# Patient Record
Sex: FEMALE | Race: WHITE | Employment: FULL TIME | URBAN - NONMETROPOLITAN AREA
[De-identification: names, ages, dates, MRNs, and addresses within clinical notes are randomized per-mention and may not be internally consistent; named-entity substitution may affect disease eponyms.]

---
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
## 2018-01-26 DIAGNOSIS — Z00.00 ROUTINE GENERAL MEDICAL EXAMINATION AT A HEALTH CARE FACILITY: ICD-10-CM

## 2018-01-26 DIAGNOSIS — R53.83 FATIGUE, UNSPECIFIED TYPE: ICD-10-CM

## 2018-01-26 LAB
ALBUMIN SERPL-MCNC: 4.4 G/DL (ref 3.5–5.2)
ALP BLD-CCNC: 101 U/L (ref 35–104)
ALT SERPL-CCNC: 15 U/L (ref 5–33)
ANION GAP SERPL CALCULATED.3IONS-SCNC: 18 MMOL/L (ref 7–19)
AST SERPL-CCNC: 17 U/L (ref 5–32)
BASOPHILS ABSOLUTE: 0 K/UL (ref 0–0.2)
BASOPHILS RELATIVE PERCENT: 0.5 % (ref 0–1)
BILIRUB SERPL-MCNC: 0.8 MG/DL (ref 0.2–1.2)
BUN BLDV-MCNC: 8 MG/DL (ref 6–20)
CALCIUM SERPL-MCNC: 9 MG/DL (ref 8.6–10)
CHLORIDE BLD-SCNC: 100 MMOL/L (ref 98–111)
CHOLESTEROL, TOTAL: 167 MG/DL (ref 160–199)
CO2: 26 MMOL/L (ref 22–29)
CREAT SERPL-MCNC: 0.6 MG/DL (ref 0.5–0.9)
EOSINOPHILS ABSOLUTE: 0.1 K/UL (ref 0–0.6)
EOSINOPHILS RELATIVE PERCENT: 1.1 % (ref 0–5)
GFR NON-AFRICAN AMERICAN: >60
GLUCOSE BLD-MCNC: 93 MG/DL (ref 74–109)
HCT VFR BLD CALC: 42 % (ref 37–47)
HDLC SERPL-MCNC: 52 MG/DL (ref 65–121)
HEMOGLOBIN: 14 G/DL (ref 12–16)
LDL CHOLESTEROL CALCULATED: 82 MG/DL
LYMPHOCYTES ABSOLUTE: 1.8 K/UL (ref 1.1–4.5)
LYMPHOCYTES RELATIVE PERCENT: 26.4 % (ref 20–40)
MCH RBC QN AUTO: 29.4 PG (ref 27–31)
MCHC RBC AUTO-ENTMCNC: 33.3 G/DL (ref 33–37)
MCV RBC AUTO: 88.2 FL (ref 81–99)
MONOCYTES ABSOLUTE: 0.5 K/UL (ref 0–0.9)
MONOCYTES RELATIVE PERCENT: 7.1 % (ref 0–10)
NEUTROPHILS ABSOLUTE: 4.3 K/UL (ref 1.5–7.5)
NEUTROPHILS RELATIVE PERCENT: 64.6 % (ref 50–65)
PDW BLD-RTO: 12.2 % (ref 11.5–14.5)
PLATELET # BLD: 292 K/UL (ref 130–400)
PMV BLD AUTO: 11.7 FL (ref 9.4–12.3)
POTASSIUM SERPL-SCNC: 3.7 MMOL/L (ref 3.5–5)
RBC # BLD: 4.76 M/UL (ref 4.2–5.4)
SODIUM BLD-SCNC: 144 MMOL/L (ref 136–145)
T4 FREE: 1.3 NG/DL (ref 0.9–1.7)
TOTAL PROTEIN: 7.3 G/DL (ref 6.6–8.7)
TRIGL SERPL-MCNC: 167 MG/DL (ref 0–149)
TSH SERPL DL<=0.05 MIU/L-ACNC: 2.4 UIU/ML (ref 0.27–4.2)
WBC # BLD: 6.7 K/UL (ref 4.8–10.8)

## 2018-02-02 ENCOUNTER — OFFICE VISIT (OUTPATIENT)
Dept: FAMILY MEDICINE CLINIC | Age: 18
End: 2018-02-02
Payer: COMMERCIAL

## 2018-02-02 VITALS
HEART RATE: 93 BPM | TEMPERATURE: 97.9 F | HEIGHT: 66 IN | BODY MASS INDEX: 25.49 KG/M2 | OXYGEN SATURATION: 98 % | DIASTOLIC BLOOD PRESSURE: 72 MMHG | SYSTOLIC BLOOD PRESSURE: 120 MMHG | WEIGHT: 158.6 LBS

## 2018-02-02 DIAGNOSIS — Z00.00 ANNUAL PHYSICAL EXAM: ICD-10-CM

## 2018-02-02 PROCEDURE — 99395 PREV VISIT EST AGE 18-39: CPT | Performed by: INTERNAL MEDICINE

## 2018-02-02 ASSESSMENT — ENCOUNTER SYMPTOMS
VOICE CHANGE: 0
COLOR CHANGE: 0
SORE THROAT: 0
EYE PAIN: 0
EYE REDNESS: 0
VOMITING: 0
WHEEZING: 0
RHINORRHEA: 0
DIARRHEA: 0
EYE DISCHARGE: 0
CHEST TIGHTNESS: 0
COUGH: 0
BLOOD IN STOOL: 0
SINUS PRESSURE: 0
ABDOMINAL PAIN: 0
SHORTNESS OF BREATH: 0

## 2018-02-02 ASSESSMENT — PATIENT HEALTH QUESTIONNAIRE - PHQ9
2. FEELING DOWN, DEPRESSED OR HOPELESS: 0
SUM OF ALL RESPONSES TO PHQ9 QUESTIONS 1 & 2: 0
SUM OF ALL RESPONSES TO PHQ QUESTIONS 1-9: 0
1. LITTLE INTEREST OR PLEASURE IN DOING THINGS: 0

## 2018-02-02 NOTE — PROGRESS NOTES
Instructions  Your Care Instructions    Physical exams can help you stay healthy. Your doctor has checked your overall health and may have suggested ways to take good care of yourself. He or she also may have recommended tests. At home, you can help prevent illness with healthy eating, regular exercise, and other steps. Follow-up care is a key part of your treatment and safety. Be sure to make and go to all appointments, and call your doctor if you are having problems. It's also a good idea to know your test results and keep a list of the medicines you take. How can you care for yourself at home? · Reach and stay at a healthy weight. This will lower your risk for many problems, such as obesity, diabetes, heart disease, and high blood pressure. · Get at least 30 minutes of physical activity on most days of the week. Walking is a good choice. You also may want to do other activities, such as running, swimming, cycling, or playing tennis or team sports. Discuss any changes in your exercise program with your doctor. · Do not smoke or allow others to smoke around you. If you need help quitting, talk to your doctor about stop-smoking programs and medicines. These can increase your chances of quitting for good. · Talk to your doctor about whether you have any risk factors for sexually transmitted infections (STIs). Having one sex partner (who does not have STIs and does not have sex with anyone else) is a good way to avoid these infections. · Use birth control if you do not want to have children at this time. Talk with your doctor about the choices available and what might be best for you. · Protect your skin from too much sun. When you're outdoors from 10 a.m. to 4 p.m., stay in the shade or cover up with clothing and a hat with a wide brim. Wear sunglasses that block UV rays. Even when it's cloudy, put broad-spectrum sunscreen (SPF 30 or higher) on any exposed skin.   · See a dentist one or two times a year for checkups and to have your teeth cleaned. · Wear a seat belt in the car. · Drink alcohol in moderation, if at all. That means no more than 2 drinks a day for men and 1 drink a day for women. Follow your doctor's advice about when to have certain tests. These tests can spot problems early. For everyone  · Cholesterol. Have the fat (cholesterol) in your blood tested after age 21. Your doctor will tell you how often to have this done based on your age, family history, or other things that can increase your risk for heart disease. · Blood pressure. Have your blood pressure checked during a routine doctor visit. Your doctor will tell you how often to check your blood pressure based on your age, your blood pressure results, and other factors. · Vision. Talk with your doctor about how often to have a glaucoma test.  · Diabetes. Ask your doctor whether you should have tests for diabetes. · Colon cancer. Have a test for colon cancer at age 48. You may have one of several tests. If you are younger than 48, you may need a test earlier if you have any risk factors. Risk factors include whether you already had a precancerous polyp removed from your colon or whether your parent, brother, sister, or child has had colon cancer. For women  · Breast exam and mammogram. Talk to your doctor about when you should have a clinical breast exam and a mammogram. Medical experts differ on whether and how often women under 50 should have these tests. Your doctor can help you decide what is right for you. · Pap test and pelvic exam. Begin Pap tests at age 24. A Pap test is the best way to find cervical cancer. The test often is part of a pelvic exam. Ask how often to have this test.  · Tests for sexually transmitted infections (STIs). Ask whether you should have tests for STIs. You may be at risk if you have sex with more than one person, especially if your partners do not wear condoms.   For men  · Tests for sexually transmitted infections (STIs). Ask whether you should have tests for STIs. You may be at risk if you have sex with more than one person, especially if you do not wear a condom. · Testicular cancer exam. Ask your doctor whether you should check your testicles regularly. · Prostate exam. Talk to your doctor about whether you should have a blood test (called a PSA test) for prostate cancer. Experts differ on whether and when men should have this test. Some experts suggest it if you are older than 39 and are -American or have a father or brother who got prostate cancer when he was younger than 72. When should you call for help? Watch closely for changes in your health, and be sure to contact your doctor if you have any problems or symptoms that concern you. Where can you learn more? Go to https://VideoStep.[employer]. org and sign in to your [employer] account. Enter P072 in the [employer] box to learn more about \"Well Visit, Ages 25 to 48: Care Instructions. \"     If you do not have an account, please click on the \"Sign Up Now\" link. Current as of: May 12, 2017  Content Version: 11.5  © 8757-4020 Healthwise, qualifyor. Care instructions adapted under license by [facility] ([facility]). If you have questions about a medical condition or this instruction, always ask your healthcare professional. [place] [address] any warranty or liability for your use of this information. Patient voices understanding and agrees to plans along with risks and benefits of plan. Counseling:  Manuela Stevenson's case, medications and options were discussed in detail. Patient was instructed to call the office if she   questions regarding her treatment. Should her conditions worsen, she should return to office to be reassessed by Dr. Lee Whaley. she  Should to go the closest Emergency Department for any emergency. They verbalized understanding the above instructions.            Return in about 1

## 2018-02-02 NOTE — PATIENT INSTRUCTIONS
exposed skin. · See a dentist one or two times a year for checkups and to have your teeth cleaned. · Wear a seat belt in the car. · Drink alcohol in moderation, if at all. That means no more than 2 drinks a day for men and 1 drink a day for women. Follow your doctor's advice about when to have certain tests. These tests can spot problems early. For everyone  · Cholesterol. Have the fat (cholesterol) in your blood tested after age 21. Your doctor will tell you how often to have this done based on your age, family history, or other things that can increase your risk for heart disease. · Blood pressure. Have your blood pressure checked during a routine doctor visit. Your doctor will tell you how often to check your blood pressure based on your age, your blood pressure results, and other factors. · Vision. Talk with your doctor about how often to have a glaucoma test.  · Diabetes. Ask your doctor whether you should have tests for diabetes. · Colon cancer. Have a test for colon cancer at age 48. You may have one of several tests. If you are younger than 48, you may need a test earlier if you have any risk factors. Risk factors include whether you already had a precancerous polyp removed from your colon or whether your parent, brother, sister, or child has had colon cancer. For women  · Breast exam and mammogram. Talk to your doctor about when you should have a clinical breast exam and a mammogram. Medical experts differ on whether and how often women under 50 should have these tests. Your doctor can help you decide what is right for you. · Pap test and pelvic exam. Begin Pap tests at age 24. A Pap test is the best way to find cervical cancer. The test often is part of a pelvic exam. Ask how often to have this test.  · Tests for sexually transmitted infections (STIs). Ask whether you should have tests for STIs.  You may be at risk if you have sex with more than one person, especially if your partners do not wear condoms. For men  · Tests for sexually transmitted infections (STIs). Ask whether you should have tests for STIs. You may be at risk if you have sex with more than one person, especially if you do not wear a condom. · Testicular cancer exam. Ask your doctor whether you should check your testicles regularly. · Prostate exam. Talk to your doctor about whether you should have a blood test (called a PSA test) for prostate cancer. Experts differ on whether and when men should have this test. Some experts suggest it if you are older than 39 and are -American or have a father or brother who got prostate cancer when he was younger than 72. When should you call for help? Watch closely for changes in your health, and be sure to contact your doctor if you have any problems or symptoms that concern you. Where can you learn more? Go to https://Laszlo Systemspemaryeb.healthSellbox. org and sign in to your [employer] account. Enter P072 in the [employer] box to learn more about \"Well Visit, Ages 25 to 48: Care Instructions. \"     If you do not have an account, please click on the \"Sign Up Now\" link. Current as of: May 12, 2017  Content Version: 11.5  © 2401-6835 Healthwise, Incorporated. Care instructions adapted under license by [facility] ([facility]). If you have questions about a medical condition or this instruction, always ask your healthcare professional. [address]  any warranty or liability for your use of this information.

## 2018-04-11 PROBLEM — Z00.00 ANNUAL PHYSICAL EXAM: Status: RESOLVED | Noted: 2018-02-02 | Resolved: 2018-04-11

## 2018-12-05 ENCOUNTER — OFFICE VISIT (OUTPATIENT)
Dept: URGENT CARE | Age: 18
End: 2018-12-05
Payer: COMMERCIAL

## 2018-12-05 ENCOUNTER — HOSPITAL ENCOUNTER (OUTPATIENT)
Dept: GENERAL RADIOLOGY | Age: 18
Discharge: HOME | End: 2018-12-05
Payer: COMMERCIAL

## 2018-12-05 VITALS
RESPIRATION RATE: 16 BRPM | HEIGHT: 65 IN | OXYGEN SATURATION: 98 % | DIASTOLIC BLOOD PRESSURE: 71 MMHG | TEMPERATURE: 97.6 F | SYSTOLIC BLOOD PRESSURE: 125 MMHG | WEIGHT: 150 LBS | HEART RATE: 82 BPM | BODY MASS INDEX: 24.99 KG/M2

## 2018-12-05 DIAGNOSIS — M79.671 RIGHT FOOT PAIN: ICD-10-CM

## 2018-12-05 DIAGNOSIS — S92.354A CLOSED NONDISPLACED FRACTURE OF FIFTH METATARSAL BONE OF RIGHT FOOT, INITIAL ENCOUNTER: Primary | ICD-10-CM

## 2018-12-05 PROCEDURE — 99213 OFFICE O/P EST LOW 20 MIN: CPT | Performed by: NURSE PRACTITIONER

## 2018-12-05 PROCEDURE — 73630 X-RAY EXAM OF FOOT: CPT

## 2018-12-05 ASSESSMENT — ENCOUNTER SYMPTOMS: COUGH: 0

## 2018-12-05 NOTE — PATIENT INSTRUCTIONS
Patient Education        Metatarsal Fracture: Care Instructions  Your Care Instructions    A metatarsal fracture is a break or a thin, hairline crack in one of the metatarsal bones of the foot. This type of fracture usually happens from repeated stress on the bones of the foot. Or it can happen when a person jumps or changes direction quickly and twists his or her foot or ankle the wrong way. This fracture is common among dancers because their work involves a lot of jumping, and balancing and turning on one foot. Treatment depends on how bad the fracture is and where the fracture is on the bone. You may or may not have had surgery. Your doctor may have put your foot in a cast or splint to keep it stable. You may have been given crutches to use to keep weight off your foot. A metatarsal fracture may take from 6 weeks to several months to heal. It is important to give your foot time to heal completely, so that you do not hurt it again. Do not return to your usual activities until your doctor says you can. Your doctor may suggest that you get physical therapy to help regain strength and range of motion in your foot. You heal best when you take good care of yourself. Eat a variety of healthy foods, and don't smoke. Follow-up care is a key part of your treatment and safety. Be sure to make and go to all appointments, and call your doctor if you are having problems. It is also a good idea to know your test results and keep a list of the medicines you take. How can you care for yourself at home? · Be safe with medicines. Read and follow all instructions on the label. ? If your doctor gave you a prescription medicine for pain, take it as prescribed. ? If you are not taking a prescription pain medicine, ask your doctor if you can take an over-the-counter medicine. · Follow your doctor's instructions about how much weight you can put on your foot and when you can go back to your usual activities.  If you were given day by day.     · You do not get better as expected. Where can you learn more? Go to https://chpepiceweb.[employer]. org and sign in to your F[employer] account. Enter [phone] in the [facility] box to learn more about \"Metatarsal Fracture: Care Instructions. \"     If you do not have an account, please click on the \"Sign Up Now\" link. Current as of: November 29, 2017  Content Version: 11.8  © 6072-2736 Healthwise, Incorporated. Care instructions adapted under license by [facility] ([facility]). If you have questions about a medical condition or this instruction, always ask your healthcare professional. [place] [address] any warranty or liability for your use of this information. 1. Ortho referral  2. Walking shoe  3. Ibuprofen for pain  4. Rest  5. Warm soaks in epsom salt   6.  Return to clinic if symptoms worsen or do not improve

## 2018-12-05 NOTE — PROGRESS NOTES
foot: There is decreased range of motion and tenderness. There is no swelling, normal capillary refill, no deformity and no laceration. Feet:    Skin: Skin is warm and dry. She is not diaphoretic. Nursing note and vitals reviewed. /71   Pulse 82   Temp 97.6 °F (36.4 °C) (Temporal)   Resp 16   Ht 5' 5\" (1.651 m)   Wt 150 lb (68 kg)   LMP 11/21/2018   SpO2 98%   BMI 24.96 kg/m²     Assessment:       Diagnosis Orders   1. Closed nondisplaced fracture of fifth metatarsal bone of right foot, initial encounter  Orthopaedic Erik Moss MD   2. Right foot pain  XR FOOT RIGHT (MIN 3 VIEWS)       Plan:   Xray:   A nondisplaced intra-articular fracture involving the base  of the fifth metatarsal bone. 1. Ortho referral  2. Walking shoe  3. Ibuprofen for pain  4. Rest  5. Warm soaks in epsom salt   6. Return to clinic if symptoms worsen or do not improve   Orders Placed This Encounter   Procedures    XR FOOT RIGHT (MIN 3 VIEWS)     Standing Status:   Future     Number of Occurrences:   1     Standing Expiration Date:   12/5/2019     Order Specific Question:   Reason for exam:     Answer:   injury   Stefanie Andres MD     Referral Priority:   Routine     Referral Type:   Eval and Treat     Referral Reason:   Specialty Services Required     Referred to Provider:   Rock Lucien MD     Requested Specialty:   Orthopedic Surgery     Number of Visits Requested:   1       No Follow-up on file. No orders of the defined types were placed in this encounter. Patient given educational materials- see patient instructions. Discussed use, benefit, and side effects of prescribedmedications. All patient questions answered. Pt voiced understanding. Reviewedhealth maintenance. Instructed to continue current medications, diet and exercise. Patient agreed with treatment plan. Follow up as directed.      Patient Instructions       Patient Education directed. · Put ice or a cold pack on your foot for 10 to 20 minutes at a time. Try to do this every 1 to 2 hours for the next 3 days (when you are awake) or until the swelling goes down. Put a thin cloth between the ice and your skin. · Prop up your foot on a pillow when you ice it or anytime you sit or lie down for the next 3 days. Try to keep it above the level of your heart. This will help reduce swelling. Cast and splint care  · If your foot is in a cast or splint, follow the cast or splint care instructions your doctor gives you. If you have a removable fiberglass walking cast or a splint, do not take it off unless your doctor tells you to. · Keep your cast or splint dry. If you have a removable fiberglass walking cast or a splint, ask your doctor if it is okay to remove it to bathe. Your doctor may want you to keep it on as much as possible. · If you're told to keep your cast or splint on, tape a sheet of plastic to cover it when you bathe. Or ask your doctor about products that can help keep a cast or splint dry. Water under the cast or splint can cause your skin to itch and hurt. · Never cut your cast or stick anything down it to scratch an itch on your leg. When should you call for help? Call your doctor now or seek immediate medical care if:    · You have problems with your cast or splint. For example:  ? The skin under the cast or splint is burning or stinging. ? The cast or splint feels too tight. ? There is a lot of swelling near the cast or splint. (Some swelling is normal.)  ? You have a new fever. ?  There is drainage or a bad smell coming from the cast or splint.     · You have increased or severe pain.     · You have tingling, weakness, or numbness in your foot and toes.     · You cannot move your toes.     · Your foot turns cold or changes color.    Watch closely for changes in your health, and be sure to contact your doctor if:    · The pain does not get better day by day.     · You do

## 2019-04-24 ENCOUNTER — NURSE ONLY (OUTPATIENT)
Dept: FAMILY MEDICINE CLINIC | Age: 19
End: 2019-04-24
Payer: COMMERCIAL

## 2019-04-24 DIAGNOSIS — Z23 NEED FOR TDAP VACCINATION: ICD-10-CM

## 2019-04-24 DIAGNOSIS — Z23 NEED FOR MENACTRA VACCINATION: ICD-10-CM

## 2019-04-24 DIAGNOSIS — Z23 NEED FOR PNEUMOCOCCAL VACCINATION: Primary | ICD-10-CM

## 2019-04-24 PROCEDURE — 90471 IMMUNIZATION ADMIN: CPT | Performed by: INTERNAL MEDICINE

## 2019-04-24 PROCEDURE — 90715 TDAP VACCINE 7 YRS/> IM: CPT | Performed by: INTERNAL MEDICINE

## 2019-04-24 PROCEDURE — 90670 PCV13 VACCINE IM: CPT | Performed by: INTERNAL MEDICINE

## 2019-04-24 PROCEDURE — 90472 IMMUNIZATION ADMIN EACH ADD: CPT | Performed by: INTERNAL MEDICINE

## 2019-04-24 PROCEDURE — 90734 MENACWYD/MENACWYCRM VACC IM: CPT | Performed by: INTERNAL MEDICINE

## 2019-04-24 NOTE — PROGRESS NOTES
After obtaining consent, and per orders of Dorian Francisco MD an  injection of Tdap given in Left deltoid. Prevnar 13 and Menveo given in right deltoid No adverse reaction noted. Well tolerated by patient. . Given By Heidy Jean.

## 2019-06-28 ENCOUNTER — OFFICE VISIT (OUTPATIENT)
Dept: FAMILY MEDICINE CLINIC | Age: 19
End: 2019-06-28
Payer: COMMERCIAL

## 2019-06-28 VITALS
DIASTOLIC BLOOD PRESSURE: 70 MMHG | SYSTOLIC BLOOD PRESSURE: 126 MMHG | OXYGEN SATURATION: 99 % | WEIGHT: 137.5 LBS | BODY MASS INDEX: 22.88 KG/M2 | HEART RATE: 78 BPM | TEMPERATURE: 98.6 F

## 2019-06-28 DIAGNOSIS — Z30.44 ENCOUNTER FOR SURVEILLANCE OF VAGINAL RING HORMONAL CONTRACEPTIVE DEVICE: ICD-10-CM

## 2019-06-28 DIAGNOSIS — N92.6 IRREGULAR MENSES: Primary | ICD-10-CM

## 2019-06-28 LAB
CONTROL: NORMAL
PREGNANCY TEST URINE, POC: NEGATIVE

## 2019-06-28 PROCEDURE — 99212 OFFICE O/P EST SF 10 MIN: CPT | Performed by: CLINICAL NURSE SPECIALIST

## 2019-06-28 PROCEDURE — 81025 URINE PREGNANCY TEST: CPT | Performed by: CLINICAL NURSE SPECIALIST

## 2019-06-28 RX ORDER — ETONOGESTREL AND ETHINYL ESTRADIOL 11.7; 2.7 MG/1; MG/1
1 INSERT, EXTENDED RELEASE VAGINAL
Qty: 3 EACH | Refills: 3 | Status: SHIPPED | OUTPATIENT
Start: 2019-06-28 | End: 2019-12-02 | Stop reason: SDUPTHER

## 2019-06-28 NOTE — PROGRESS NOTES
SUBJECTIVE:  Devora Huffman is a 22 y.o. who presents today for Contraception (Patient would like to discuss options for contraception.)      YANY Varela presents today to discuss contraceptive options. She has never used rx contraception. She has irregular periods. Normal bleeding without cramping. Desires something besides oral.  Also wants to have a monthly bleeding. LMP last week. Past Medical History:   Diagnosis Date    Foot fracture, right      No past surgical history on file. Family History   Problem Relation Age of Onset    Diabetes Paternal Grandfather     Anxiety Disorder Father     Bipolar Disorder Father     Anxiety Disorder Sister     Other Sister         mood disorder     Social History     Tobacco Use    Smoking status: Never Smoker    Smokeless tobacco: Never Used   Substance Use Topics    Alcohol use: No     Current Outpatient Medications   Medication Sig Dispense Refill    etonogestrel-ethinyl estradiol (NUVARING) 0.12-0.015 MG/24HR vaginal ring Place 1 each vaginally every 21 days Insert one (1) ring vaginally and leave in place for three (3) weeks, then remove for one (1) week. 3 each 3     No current facility-administered medications for this visit. No Known Allergies    Review of Systems   Constitutional: Negative for appetite change, chills and fever. Genitourinary: Positive for menstrual problem. Psychiatric/Behavioral: Negative for dysphoric mood. OBJECTIVE:  /70   Pulse 78   Temp 98.6 °F (37 °C) (Temporal)   Wt 137 lb 8 oz (62.4 kg)   SpO2 99%   BMI 22.88 kg/m²    Physical Exam   Constitutional: She is oriented to person, place, and time. She appears well-developed and well-nourished. No distress. Cardiovascular: Normal rate, regular rhythm and normal heart sounds. No murmur heard. Pulmonary/Chest: Effort normal and breath sounds normal. No respiratory distress. Neurological: She is alert and oriented to person, place, and time.

## 2019-10-16 ENCOUNTER — TELEPHONE (OUTPATIENT)
Dept: ULTRASOUND IMAGING | Age: 19
End: 2019-10-16

## 2019-10-16 ENCOUNTER — OFFICE VISIT (OUTPATIENT)
Dept: FAMILY MEDICINE CLINIC | Age: 19
End: 2019-10-16
Payer: COMMERCIAL

## 2019-10-16 VITALS
HEART RATE: 105 BPM | HEIGHT: 67 IN | DIASTOLIC BLOOD PRESSURE: 72 MMHG | SYSTOLIC BLOOD PRESSURE: 116 MMHG | TEMPERATURE: 97.9 F | BODY MASS INDEX: 21.56 KG/M2 | WEIGHT: 137.4 LBS | OXYGEN SATURATION: 98 %

## 2019-10-16 DIAGNOSIS — R11.2 NON-INTRACTABLE VOMITING WITH NAUSEA, UNSPECIFIED VOMITING TYPE: ICD-10-CM

## 2019-10-16 DIAGNOSIS — R10.11 RIGHT UPPER QUADRANT PAIN: ICD-10-CM

## 2019-10-16 DIAGNOSIS — R10.11 RIGHT UPPER QUADRANT PAIN: Primary | ICD-10-CM

## 2019-10-16 LAB
ALBUMIN SERPL-MCNC: 4.7 G/DL (ref 3.5–5.2)
ALP BLD-CCNC: 81 U/L (ref 35–104)
ALT SERPL-CCNC: 13 U/L (ref 5–33)
AMYLASE: 37 U/L (ref 28–100)
ANION GAP SERPL CALCULATED.3IONS-SCNC: 10 MMOL/L (ref 7–19)
AST SERPL-CCNC: 12 U/L (ref 5–32)
BASOPHILS ABSOLUTE: 0.1 K/UL (ref 0–0.2)
BASOPHILS RELATIVE PERCENT: 0.7 % (ref 0–1)
BILIRUB SERPL-MCNC: 0.8 MG/DL (ref 0.2–1.2)
BUN BLDV-MCNC: 7 MG/DL (ref 6–20)
CALCIUM SERPL-MCNC: 9.4 MG/DL (ref 8.6–10)
CHLORIDE BLD-SCNC: 103 MMOL/L (ref 98–111)
CHOLESTEROL, TOTAL: 174 MG/DL (ref 160–199)
CO2: 30 MMOL/L (ref 22–29)
CREAT SERPL-MCNC: 0.6 MG/DL (ref 0.5–0.9)
EOSINOPHILS ABSOLUTE: 0.1 K/UL (ref 0–0.6)
EOSINOPHILS RELATIVE PERCENT: 1.6 % (ref 0–5)
GFR NON-AFRICAN AMERICAN: >60
GLUCOSE BLD-MCNC: 87 MG/DL (ref 74–109)
HCT VFR BLD CALC: 43.9 % (ref 37–47)
HDLC SERPL-MCNC: 67 MG/DL (ref 65–121)
HEMOGLOBIN: 14.2 G/DL (ref 12–16)
IMMATURE GRANULOCYTES #: 0 K/UL
LDL CHOLESTEROL CALCULATED: 96 MG/DL
LIPASE: 44 U/L (ref 13–60)
LYMPHOCYTES ABSOLUTE: 2.2 K/UL (ref 1.1–4.5)
LYMPHOCYTES RELATIVE PERCENT: 30.7 % (ref 20–40)
MCH RBC QN AUTO: 28.8 PG (ref 27–31)
MCHC RBC AUTO-ENTMCNC: 32.3 G/DL (ref 33–37)
MCV RBC AUTO: 89 FL (ref 81–99)
MONOCYTES ABSOLUTE: 0.6 K/UL (ref 0–0.9)
MONOCYTES RELATIVE PERCENT: 8.6 % (ref 0–10)
NEUTROPHILS ABSOLUTE: 4.2 K/UL (ref 1.5–7.5)
NEUTROPHILS RELATIVE PERCENT: 58.1 % (ref 50–65)
PDW BLD-RTO: 13.3 % (ref 11.5–14.5)
PLATELET # BLD: 257 K/UL (ref 130–400)
PMV BLD AUTO: 11.6 FL (ref 9.4–12.3)
POTASSIUM SERPL-SCNC: 3.8 MMOL/L (ref 3.5–5)
RBC # BLD: 4.93 M/UL (ref 4.2–5.4)
SODIUM BLD-SCNC: 143 MMOL/L (ref 136–145)
TOTAL PROTEIN: 7.6 G/DL (ref 6.6–8.7)
TRIGL SERPL-MCNC: 56 MG/DL (ref 0–149)
WBC # BLD: 7.3 K/UL (ref 4.8–10.8)

## 2019-10-16 PROCEDURE — 99213 OFFICE O/P EST LOW 20 MIN: CPT | Performed by: INTERNAL MEDICINE

## 2019-10-16 ASSESSMENT — ENCOUNTER SYMPTOMS
EYE DISCHARGE: 0
COLOR CHANGE: 0
EYE PAIN: 0
SORE THROAT: 0
ABDOMINAL DISTENTION: 0
EYE REDNESS: 0
SINUS PRESSURE: 0
CHEST TIGHTNESS: 0
BLOOD IN STOOL: 0
NAUSEA: 1
DIARRHEA: 0
RHINORRHEA: 0
CONSTIPATION: 0
VOMITING: 1
VOICE CHANGE: 0
SHORTNESS OF BREATH: 0
COUGH: 0
WHEEZING: 0
ABDOMINAL PAIN: 1

## 2019-10-16 ASSESSMENT — PATIENT HEALTH QUESTIONNAIRE - PHQ9
SUM OF ALL RESPONSES TO PHQ QUESTIONS 1-9: 0
SUM OF ALL RESPONSES TO PHQ QUESTIONS 1-9: 0
SUM OF ALL RESPONSES TO PHQ9 QUESTIONS 1 & 2: 0
2. FEELING DOWN, DEPRESSED OR HOPELESS: 0
1. LITTLE INTEREST OR PLEASURE IN DOING THINGS: 0

## 2019-10-24 ENCOUNTER — HOSPITAL ENCOUNTER (OUTPATIENT)
Dept: ULTRASOUND IMAGING | Age: 19
Discharge: HOME | End: 2019-10-24
Payer: COMMERCIAL

## 2019-10-24 DIAGNOSIS — R10.11 RIGHT UPPER QUADRANT PAIN: ICD-10-CM

## 2019-10-24 DIAGNOSIS — R10.11 RIGHT UPPER QUADRANT PAIN: Primary | ICD-10-CM

## 2019-10-24 DIAGNOSIS — K80.20 GALLSTONES: ICD-10-CM

## 2019-10-24 PROCEDURE — 76705 ECHO EXAM OF ABDOMEN: CPT

## 2019-10-31 ENCOUNTER — OFFICE VISIT (OUTPATIENT)
Dept: SURGERY | Age: 19
End: 2019-10-31
Payer: COMMERCIAL

## 2019-10-31 VITALS
SYSTOLIC BLOOD PRESSURE: 116 MMHG | HEIGHT: 67 IN | DIASTOLIC BLOOD PRESSURE: 70 MMHG | BODY MASS INDEX: 21.97 KG/M2 | WEIGHT: 140 LBS

## 2019-10-31 DIAGNOSIS — K80.10 CALCULUS OF GALLBLADDER WITH CHRONIC CHOLECYSTITIS WITHOUT OBSTRUCTION: ICD-10-CM

## 2019-10-31 PROCEDURE — 99203 OFFICE O/P NEW LOW 30 MIN: CPT | Performed by: SURGERY

## 2019-10-31 ASSESSMENT — ENCOUNTER SYMPTOMS
EYE PAIN: 0
CONSTIPATION: 0
DIARRHEA: 0
ABDOMINAL PAIN: 0
CHEST TIGHTNESS: 0
SHORTNESS OF BREATH: 0
ABDOMINAL DISTENTION: 0
COUGH: 0
NAUSEA: 0
BACK PAIN: 0
SORE THROAT: 0
VOMITING: 0
COLOR CHANGE: 0
EYE REDNESS: 0

## 2019-11-01 RX ORDER — HEPARIN SODIUM 5000 [USP'U]/ML
5000 INJECTION, SOLUTION INTRAVENOUS; SUBCUTANEOUS ONCE
Status: CANCELLED | OUTPATIENT
Start: 2019-11-01 | End: 2019-11-01

## 2019-11-01 RX ORDER — SODIUM CHLORIDE 0.9 % (FLUSH) 0.9 %
10 SYRINGE (ML) INJECTION PRN
Status: CANCELLED | OUTPATIENT
Start: 2019-11-01

## 2019-11-01 RX ORDER — SODIUM CHLORIDE 0.9 % (FLUSH) 0.9 %
10 SYRINGE (ML) INJECTION EVERY 12 HOURS SCHEDULED
Status: CANCELLED | OUTPATIENT
Start: 2019-11-01

## 2019-11-01 RX ORDER — SODIUM CHLORIDE, SODIUM LACTATE, POTASSIUM CHLORIDE, CALCIUM CHLORIDE 600; 310; 30; 20 MG/100ML; MG/100ML; MG/100ML; MG/100ML
INJECTION, SOLUTION INTRAVENOUS CONTINUOUS
Status: CANCELLED | OUTPATIENT
Start: 2019-11-01

## 2019-11-21 ENCOUNTER — ANESTHESIA EVENT (OUTPATIENT)
Dept: OPERATING ROOM | Age: 19
End: 2019-11-21
Payer: COMMERCIAL

## 2019-11-22 ENCOUNTER — ANESTHESIA (OUTPATIENT)
Dept: OPERATING ROOM | Age: 19
End: 2019-11-22
Payer: COMMERCIAL

## 2019-11-22 ENCOUNTER — HOSPITAL ENCOUNTER (OUTPATIENT)
Age: 19
Setting detail: OUTPATIENT SURGERY
Discharge: HOME | End: 2019-11-22
Attending: SURGERY | Admitting: SURGERY
Payer: COMMERCIAL

## 2019-11-22 VITALS
RESPIRATION RATE: 16 BRPM | DIASTOLIC BLOOD PRESSURE: 61 MMHG | WEIGHT: 137 LBS | HEART RATE: 119 BPM | HEIGHT: 68 IN | TEMPERATURE: 97.3 F | OXYGEN SATURATION: 100 % | SYSTOLIC BLOOD PRESSURE: 116 MMHG | BODY MASS INDEX: 20.76 KG/M2

## 2019-11-22 VITALS
RESPIRATION RATE: 17 BRPM | OXYGEN SATURATION: 98 % | DIASTOLIC BLOOD PRESSURE: 48 MMHG | TEMPERATURE: 97.2 F | SYSTOLIC BLOOD PRESSURE: 86 MMHG

## 2019-11-22 DIAGNOSIS — K80.10 CALCULUS OF GALLBLADDER WITH CHRONIC CHOLECYSTITIS WITHOUT OBSTRUCTION: Primary | ICD-10-CM

## 2019-11-22 LAB — HCG(URINE) PREGNANCY TEST: NEGATIVE

## 2019-11-22 PROCEDURE — 6360000002 HC RX W HCPCS: Performed by: SURGERY

## 2019-11-22 PROCEDURE — 7100000011 HC PHASE II RECOVERY - ADDTL 15 MIN: Performed by: SURGERY

## 2019-11-22 PROCEDURE — 88304 TISSUE EXAM BY PATHOLOGIST: CPT

## 2019-11-22 PROCEDURE — 2500000003 HC RX 250 WO HCPCS: Performed by: SURGERY

## 2019-11-22 PROCEDURE — 7100000001 HC PACU RECOVERY - ADDTL 15 MIN: Performed by: SURGERY

## 2019-11-22 PROCEDURE — 47562 LAPAROSCOPIC CHOLECYSTECTOMY: CPT | Performed by: SURGERY

## 2019-11-22 PROCEDURE — 3600000019 HC SURGERY ROBOT ADDTL 15MIN: Performed by: SURGERY

## 2019-11-22 PROCEDURE — 3700000000 HC ANESTHESIA ATTENDED CARE: Performed by: SURGERY

## 2019-11-22 PROCEDURE — 84703 CHORIONIC GONADOTROPIN ASSAY: CPT

## 2019-11-22 PROCEDURE — 3700000001 HC ADD 15 MINUTES (ANESTHESIA): Performed by: SURGERY

## 2019-11-22 PROCEDURE — 6360000002 HC RX W HCPCS: Performed by: NURSE ANESTHETIST, CERTIFIED REGISTERED

## 2019-11-22 PROCEDURE — 2500000003 HC RX 250 WO HCPCS: Performed by: NURSE ANESTHETIST, CERTIFIED REGISTERED

## 2019-11-22 PROCEDURE — 6370000000 HC RX 637 (ALT 250 FOR IP): Performed by: ANESTHESIOLOGY

## 2019-11-22 PROCEDURE — 7100000000 HC PACU RECOVERY - FIRST 15 MIN: Performed by: SURGERY

## 2019-11-22 PROCEDURE — 3600000009 HC SURGERY ROBOT BASE: Performed by: SURGERY

## 2019-11-22 PROCEDURE — C9290 INJ, BUPIVACAINE LIPOSOME: HCPCS | Performed by: SURGERY

## 2019-11-22 PROCEDURE — 7100000010 HC PHASE II RECOVERY - FIRST 15 MIN: Performed by: SURGERY

## 2019-11-22 PROCEDURE — 2500000003 HC RX 250 WO HCPCS: Performed by: ANESTHESIOLOGY

## 2019-11-22 PROCEDURE — 2580000003 HC RX 258: Performed by: SURGERY

## 2019-11-22 PROCEDURE — 2709999900 HC NON-CHARGEABLE SUPPLY: Performed by: SURGERY

## 2019-11-22 PROCEDURE — S2900 ROBOTIC SURGICAL SYSTEM: HCPCS | Performed by: SURGERY

## 2019-11-22 PROCEDURE — 2580000003 HC RX 258: Performed by: NURSE ANESTHETIST, CERTIFIED REGISTERED

## 2019-11-22 RX ORDER — LIDOCAINE HYDROCHLORIDE 10 MG/ML
INJECTION, SOLUTION EPIDURAL; INFILTRATION; INTRACAUDAL; PERINEURAL PRN
Status: DISCONTINUED | OUTPATIENT
Start: 2019-11-22 | End: 2019-11-22 | Stop reason: SDUPTHER

## 2019-11-22 RX ORDER — SODIUM CHLORIDE 0.9 % (FLUSH) 0.9 %
10 SYRINGE (ML) INJECTION PRN
Status: DISCONTINUED | OUTPATIENT
Start: 2019-11-22 | End: 2019-11-22 | Stop reason: HOSPADM

## 2019-11-22 RX ORDER — SUCCINYLCHOLINE CHLORIDE 20 MG/ML
INJECTION INTRAMUSCULAR; INTRAVENOUS PRN
Status: DISCONTINUED | OUTPATIENT
Start: 2019-11-22 | End: 2019-11-22 | Stop reason: SDUPTHER

## 2019-11-22 RX ORDER — SODIUM CHLORIDE, SODIUM LACTATE, POTASSIUM CHLORIDE, CALCIUM CHLORIDE 600; 310; 30; 20 MG/100ML; MG/100ML; MG/100ML; MG/100ML
INJECTION, SOLUTION INTRAVENOUS CONTINUOUS
Status: DISCONTINUED | OUTPATIENT
Start: 2019-11-22 | End: 2019-11-22 | Stop reason: HOSPADM

## 2019-11-22 RX ORDER — INDOCYANINE GREEN AND WATER 25 MG
KIT INJECTION PRN
Status: DISCONTINUED | OUTPATIENT
Start: 2019-11-22 | End: 2019-11-22 | Stop reason: SDUPTHER

## 2019-11-22 RX ORDER — FENTANYL CITRATE 50 UG/ML
INJECTION, SOLUTION INTRAMUSCULAR; INTRAVENOUS PRN
Status: DISCONTINUED | OUTPATIENT
Start: 2019-11-22 | End: 2019-11-22 | Stop reason: SDUPTHER

## 2019-11-22 RX ORDER — ENALAPRILAT 2.5 MG/2ML
1.25 INJECTION INTRAVENOUS
Status: DISCONTINUED | OUTPATIENT
Start: 2019-11-22 | End: 2019-11-22 | Stop reason: HOSPADM

## 2019-11-22 RX ORDER — DEXAMETHASONE SODIUM PHOSPHATE 10 MG/ML
INJECTION INTRAMUSCULAR; INTRAVENOUS PRN
Status: DISCONTINUED | OUTPATIENT
Start: 2019-11-22 | End: 2019-11-22 | Stop reason: SDUPTHER

## 2019-11-22 RX ORDER — BUPIVACAINE HYDROCHLORIDE 2.5 MG/ML
INJECTION, SOLUTION INFILTRATION; PERINEURAL PRN
Status: DISCONTINUED | OUTPATIENT
Start: 2019-11-22 | End: 2019-11-22 | Stop reason: ALTCHOICE

## 2019-11-22 RX ORDER — LABETALOL 20 MG/4 ML (5 MG/ML) INTRAVENOUS SYRINGE
5 EVERY 10 MIN PRN
Status: DISCONTINUED | OUTPATIENT
Start: 2019-11-22 | End: 2019-11-22 | Stop reason: HOSPADM

## 2019-11-22 RX ORDER — SODIUM CHLORIDE 0.9 % (FLUSH) 0.9 %
10 SYRINGE (ML) INJECTION EVERY 12 HOURS SCHEDULED
Status: DISCONTINUED | OUTPATIENT
Start: 2019-11-22 | End: 2019-11-22 | Stop reason: HOSPADM

## 2019-11-22 RX ORDER — SODIUM CHLORIDE 9 MG/ML
INJECTION INTRAVENOUS PRN
Status: DISCONTINUED | OUTPATIENT
Start: 2019-11-22 | End: 2019-11-22 | Stop reason: ALTCHOICE

## 2019-11-22 RX ORDER — MEPERIDINE HYDROCHLORIDE 50 MG/ML
12.5 INJECTION INTRAMUSCULAR; INTRAVENOUS; SUBCUTANEOUS EVERY 5 MIN PRN
Status: DISCONTINUED | OUTPATIENT
Start: 2019-11-22 | End: 2019-11-22 | Stop reason: HOSPADM

## 2019-11-22 RX ORDER — SCOLOPAMINE TRANSDERMAL SYSTEM 1 MG/1
1 PATCH, EXTENDED RELEASE TRANSDERMAL ONCE
Status: DISCONTINUED | OUTPATIENT
Start: 2019-11-22 | End: 2019-11-22 | Stop reason: HOSPADM

## 2019-11-22 RX ORDER — PROMETHAZINE HYDROCHLORIDE 25 MG/ML
6.25 INJECTION, SOLUTION INTRAMUSCULAR; INTRAVENOUS
Status: DISCONTINUED | OUTPATIENT
Start: 2019-11-22 | End: 2019-11-22 | Stop reason: HOSPADM

## 2019-11-22 RX ORDER — HEPARIN SODIUM 5000 [USP'U]/ML
5000 INJECTION, SOLUTION INTRAVENOUS; SUBCUTANEOUS ONCE
Status: COMPLETED | OUTPATIENT
Start: 2019-11-22 | End: 2019-11-22

## 2019-11-22 RX ORDER — MIDAZOLAM HYDROCHLORIDE 1 MG/ML
INJECTION INTRAMUSCULAR; INTRAVENOUS PRN
Status: DISCONTINUED | OUTPATIENT
Start: 2019-11-22 | End: 2019-11-22 | Stop reason: SDUPTHER

## 2019-11-22 RX ORDER — ROCURONIUM BROMIDE 10 MG/ML
INJECTION, SOLUTION INTRAVENOUS PRN
Status: DISCONTINUED | OUTPATIENT
Start: 2019-11-22 | End: 2019-11-22 | Stop reason: SDUPTHER

## 2019-11-22 RX ORDER — APREPITANT 40 MG/1
40 CAPSULE ORAL ONCE
Status: DISCONTINUED | OUTPATIENT
Start: 2019-11-22 | End: 2019-11-22 | Stop reason: HOSPADM

## 2019-11-22 RX ORDER — HYDRALAZINE HYDROCHLORIDE 20 MG/ML
5 INJECTION INTRAMUSCULAR; INTRAVENOUS EVERY 10 MIN PRN
Status: DISCONTINUED | OUTPATIENT
Start: 2019-11-22 | End: 2019-11-22 | Stop reason: HOSPADM

## 2019-11-22 RX ORDER — MIDAZOLAM HYDROCHLORIDE 1 MG/ML
2 INJECTION INTRAMUSCULAR; INTRAVENOUS
Status: DISCONTINUED | OUTPATIENT
Start: 2019-11-22 | End: 2019-11-22 | Stop reason: HOSPADM

## 2019-11-22 RX ORDER — KETOROLAC TROMETHAMINE 30 MG/ML
INJECTION, SOLUTION INTRAMUSCULAR; INTRAVENOUS PRN
Status: DISCONTINUED | OUTPATIENT
Start: 2019-11-22 | End: 2019-11-22 | Stop reason: SDUPTHER

## 2019-11-22 RX ORDER — DIPHENHYDRAMINE HYDROCHLORIDE 50 MG/ML
12.5 INJECTION INTRAMUSCULAR; INTRAVENOUS
Status: DISCONTINUED | OUTPATIENT
Start: 2019-11-22 | End: 2019-11-22 | Stop reason: HOSPADM

## 2019-11-22 RX ORDER — ONDANSETRON 2 MG/ML
INJECTION INTRAMUSCULAR; INTRAVENOUS PRN
Status: DISCONTINUED | OUTPATIENT
Start: 2019-11-22 | End: 2019-11-22 | Stop reason: SDUPTHER

## 2019-11-22 RX ORDER — MORPHINE SULFATE 4 MG/ML
4 INJECTION, SOLUTION INTRAMUSCULAR; INTRAVENOUS EVERY 5 MIN PRN
Status: DISCONTINUED | OUTPATIENT
Start: 2019-11-22 | End: 2019-11-22 | Stop reason: HOSPADM

## 2019-11-22 RX ORDER — MORPHINE SULFATE 4 MG/ML
2 INJECTION, SOLUTION INTRAMUSCULAR; INTRAVENOUS EVERY 5 MIN PRN
Status: DISCONTINUED | OUTPATIENT
Start: 2019-11-22 | End: 2019-11-22 | Stop reason: HOSPADM

## 2019-11-22 RX ORDER — PROPOFOL 10 MG/ML
INJECTION, EMULSION INTRAVENOUS PRN
Status: DISCONTINUED | OUTPATIENT
Start: 2019-11-22 | End: 2019-11-22 | Stop reason: SDUPTHER

## 2019-11-22 RX ORDER — LIDOCAINE HYDROCHLORIDE 10 MG/ML
1 INJECTION, SOLUTION EPIDURAL; INFILTRATION; INTRACAUDAL; PERINEURAL
Status: DISCONTINUED | OUTPATIENT
Start: 2019-11-22 | End: 2019-11-22 | Stop reason: HOSPADM

## 2019-11-22 RX ORDER — METOCLOPRAMIDE HYDROCHLORIDE 5 MG/ML
10 INJECTION INTRAMUSCULAR; INTRAVENOUS
Status: COMPLETED | OUTPATIENT
Start: 2019-11-22 | End: 2019-11-22

## 2019-11-22 RX ORDER — SODIUM CHLORIDE, SODIUM LACTATE, POTASSIUM CHLORIDE, CALCIUM CHLORIDE 600; 310; 30; 20 MG/100ML; MG/100ML; MG/100ML; MG/100ML
INJECTION, SOLUTION INTRAVENOUS CONTINUOUS PRN
Status: DISCONTINUED | OUTPATIENT
Start: 2019-11-22 | End: 2019-11-22 | Stop reason: SDUPTHER

## 2019-11-22 RX ORDER — MORPHINE SULFATE 4 MG/ML
4 INJECTION, SOLUTION INTRAMUSCULAR; INTRAVENOUS
Status: DISCONTINUED | OUTPATIENT
Start: 2019-11-22 | End: 2019-11-22 | Stop reason: HOSPADM

## 2019-11-22 RX ORDER — HYDROCODONE BITARTRATE AND ACETAMINOPHEN 5; 325 MG/1; MG/1
1 TABLET ORAL EVERY 4 HOURS PRN
Qty: 18 TABLET | Refills: 0 | Status: SHIPPED | OUTPATIENT
Start: 2019-11-22 | End: 2019-11-25

## 2019-11-22 RX ORDER — EPHEDRINE SULFATE 50 MG/ML
INJECTION, SOLUTION INTRAVENOUS PRN
Status: DISCONTINUED | OUTPATIENT
Start: 2019-11-22 | End: 2019-11-22 | Stop reason: SDUPTHER

## 2019-11-22 RX ORDER — FENTANYL CITRATE 50 UG/ML
50 INJECTION, SOLUTION INTRAMUSCULAR; INTRAVENOUS
Status: DISCONTINUED | OUTPATIENT
Start: 2019-11-22 | End: 2019-11-22 | Stop reason: HOSPADM

## 2019-11-22 RX ADMIN — SUCCINYLCHOLINE CHLORIDE 120 MG: 20 INJECTION, SOLUTION INTRAMUSCULAR; INTRAVENOUS at 08:02

## 2019-11-22 RX ADMIN — HEPARIN SODIUM 5000 UNITS: 5000 INJECTION INTRAVENOUS; SUBCUTANEOUS at 07:02

## 2019-11-22 RX ADMIN — METOCLOPRAMIDE 10 MG: 5 INJECTION, SOLUTION INTRAMUSCULAR; INTRAVENOUS at 09:58

## 2019-11-22 RX ADMIN — ONDANSETRON HYDROCHLORIDE 4 MG: 2 INJECTION, SOLUTION INTRAMUSCULAR; INTRAVENOUS at 08:02

## 2019-11-22 RX ADMIN — DEXAMETHASONE SODIUM PHOSPHATE 10 MG: 10 INJECTION INTRAMUSCULAR; INTRAVENOUS at 08:02

## 2019-11-22 RX ADMIN — PROPOFOL 150 MG: 10 INJECTION, EMULSION INTRAVENOUS at 08:02

## 2019-11-22 RX ADMIN — KETOROLAC TROMETHAMINE 30 MG: 30 INJECTION, SOLUTION INTRAMUSCULAR; INTRAVENOUS at 08:02

## 2019-11-22 RX ADMIN — EPHEDRINE SULFATE 10 MG: 50 INJECTION INTRAMUSCULAR; INTRAVENOUS; SUBCUTANEOUS at 08:30

## 2019-11-22 RX ADMIN — SODIUM CHLORIDE, POTASSIUM CHLORIDE, SODIUM LACTATE AND CALCIUM CHLORIDE: 600; 310; 30; 20 INJECTION, SOLUTION INTRAVENOUS at 07:01

## 2019-11-22 RX ADMIN — FENTANYL CITRATE 100 MCG: 50 INJECTION INTRAMUSCULAR; INTRAVENOUS at 08:02

## 2019-11-22 RX ADMIN — ROCURONIUM BROMIDE 40 MG: 10 SOLUTION INTRAVENOUS at 08:06

## 2019-11-22 RX ADMIN — FAMOTIDINE 20 MG: 10 INJECTION, SOLUTION INTRAVENOUS at 07:01

## 2019-11-22 RX ADMIN — MORPHINE SULFATE 4 MG: 4 INJECTION INTRAVENOUS at 10:01

## 2019-11-22 RX ADMIN — ROCURONIUM BROMIDE 10 MG: 10 SOLUTION INTRAVENOUS at 08:02

## 2019-11-22 RX ADMIN — INDOCYANINE GREEN AND WATER 7.5 MG: KIT at 08:05

## 2019-11-22 RX ADMIN — LIDOCAINE HYDROCHLORIDE 5 ML: 10 INJECTION, SOLUTION EPIDURAL; INFILTRATION; INTRACAUDAL; PERINEURAL at 08:02

## 2019-11-22 RX ADMIN — SODIUM CHLORIDE, SODIUM LACTATE, POTASSIUM CHLORIDE, AND CALCIUM CHLORIDE: 600; 310; 30; 20 INJECTION, SOLUTION INTRAVENOUS at 07:54

## 2019-11-22 RX ADMIN — MIDAZOLAM 2 MG: 1 INJECTION INTRAMUSCULAR; INTRAVENOUS at 07:54

## 2019-11-22 RX ADMIN — Medication 2 G: at 08:05

## 2019-11-22 RX ADMIN — SODIUM CHLORIDE, SODIUM LACTATE, POTASSIUM CHLORIDE, AND CALCIUM CHLORIDE: 600; 310; 30; 20 INJECTION, SOLUTION INTRAVENOUS at 09:08

## 2019-11-22 ASSESSMENT — PAIN SCALES - GENERAL
PAINLEVEL_OUTOF10: 6
PAINLEVEL_OUTOF10: 0
PAINLEVEL_OUTOF10: 0

## 2019-11-22 ASSESSMENT — PAIN DESCRIPTION - DESCRIPTORS: DESCRIPTORS: BURNING

## 2019-11-22 ASSESSMENT — PAIN SCALES - WONG BAKER: WONGBAKER_NUMERICALRESPONSE: 0

## 2019-11-22 ASSESSMENT — PAIN DESCRIPTION - PAIN TYPE: TYPE: SURGICAL PAIN

## 2019-11-22 ASSESSMENT — PAIN DESCRIPTION - ONSET: ONSET: AWAKENED FROM SLEEP

## 2019-11-22 ASSESSMENT — PAIN DESCRIPTION - FREQUENCY: FREQUENCY: CONTINUOUS

## 2019-11-22 ASSESSMENT — PAIN DESCRIPTION - ORIENTATION: ORIENTATION: MID

## 2019-11-22 ASSESSMENT — PAIN DESCRIPTION - LOCATION: LOCATION: ABDOMEN

## 2019-12-02 DIAGNOSIS — N92.6 IRREGULAR MENSES: ICD-10-CM

## 2019-12-02 DIAGNOSIS — Z30.44 ENCOUNTER FOR SURVEILLANCE OF VAGINAL RING HORMONAL CONTRACEPTIVE DEVICE: ICD-10-CM

## 2019-12-02 RX ORDER — ETONOGESTREL AND ETHINYL ESTRADIOL 11.7; 2.7 MG/1; MG/1
1 INSERT, EXTENDED RELEASE VAGINAL
Qty: 3 EACH | Refills: 3 | Status: SHIPPED | OUTPATIENT
Start: 2019-12-02 | End: 2020-02-12 | Stop reason: SDUPTHER

## 2019-12-03 ENCOUNTER — OFFICE VISIT (OUTPATIENT)
Dept: SURGERY | Age: 19
End: 2019-12-03

## 2019-12-03 VITALS
HEIGHT: 68 IN | WEIGHT: 132.6 LBS | BODY MASS INDEX: 20.1 KG/M2 | SYSTOLIC BLOOD PRESSURE: 108 MMHG | DIASTOLIC BLOOD PRESSURE: 68 MMHG

## 2019-12-03 DIAGNOSIS — K80.10 CALCULUS OF GALLBLADDER WITH CHRONIC CHOLECYSTITIS WITHOUT OBSTRUCTION: Primary | ICD-10-CM

## 2019-12-03 DIAGNOSIS — Z09 POSTOP CHECK: ICD-10-CM

## 2019-12-03 PROBLEM — R10.11 RIGHT UPPER QUADRANT PAIN: Status: RESOLVED | Noted: 2019-10-16 | Resolved: 2019-12-03

## 2019-12-03 PROCEDURE — 99024 POSTOP FOLLOW-UP VISIT: CPT | Performed by: SURGERY

## 2019-12-09 ENCOUNTER — OFFICE VISIT (OUTPATIENT)
Dept: FAMILY MEDICINE CLINIC | Age: 19
End: 2019-12-09
Payer: COMMERCIAL

## 2019-12-09 VITALS
TEMPERATURE: 98.1 F | DIASTOLIC BLOOD PRESSURE: 78 MMHG | OXYGEN SATURATION: 98 % | WEIGHT: 133 LBS | BODY MASS INDEX: 20.16 KG/M2 | HEART RATE: 116 BPM | SYSTOLIC BLOOD PRESSURE: 124 MMHG | HEIGHT: 68 IN

## 2019-12-09 DIAGNOSIS — Z12.4 CERVICAL CANCER SCREENING: ICD-10-CM

## 2019-12-09 DIAGNOSIS — Z00.00 ANNUAL PHYSICAL EXAM: Primary | ICD-10-CM

## 2019-12-09 PROCEDURE — 99395 PREV VISIT EST AGE 18-39: CPT | Performed by: INTERNAL MEDICINE

## 2019-12-09 ASSESSMENT — ENCOUNTER SYMPTOMS
EYE PAIN: 0
CHEST TIGHTNESS: 0
RHINORRHEA: 0
WHEEZING: 0
ABDOMINAL PAIN: 0
COUGH: 0
EYE REDNESS: 0
DIARRHEA: 0
SINUS PRESSURE: 0
VOMITING: 0
VOICE CHANGE: 0
SHORTNESS OF BREATH: 0
SORE THROAT: 0
EYE DISCHARGE: 0
BLOOD IN STOOL: 0
COLOR CHANGE: 0

## 2019-12-10 DIAGNOSIS — Z12.4 CERVICAL CANCER SCREENING: Primary | ICD-10-CM

## 2019-12-10 DIAGNOSIS — Z12.4 CERVICAL CANCER SCREENING: ICD-10-CM

## 2019-12-16 ENCOUNTER — TELEPHONE (OUTPATIENT)
Dept: FAMILY MEDICINE CLINIC | Age: 19
End: 2019-12-16

## 2020-02-12 RX ORDER — ETONOGESTREL AND ETHINYL ESTRADIOL 11.7; 2.7 MG/1; MG/1
1 INSERT, EXTENDED RELEASE VAGINAL
Qty: 3 EACH | Refills: 3 | Status: SHIPPED | OUTPATIENT
Start: 2020-02-12 | End: 2020-02-28 | Stop reason: SDUPTHER

## 2020-02-28 RX ORDER — ETONOGESTREL AND ETHINYL ESTRADIOL 11.7; 2.7 MG/1; MG/1
1 INSERT, EXTENDED RELEASE VAGINAL
Qty: 3 EACH | Refills: 3 | Status: SHIPPED | OUTPATIENT
Start: 2020-02-28 | End: 2020-05-16 | Stop reason: SDUPTHER

## 2020-02-28 NOTE — TELEPHONE ENCOUNTER
Andrés Mandujano called to request a refill on her medication. Last office visit : 12/9/2019   Next office visit : 12/10/2020     Requested Prescriptions     Pending Prescriptions Disp Refills    etonogestrel-ethinyl estradiol (NUVARING) 0.12-0.015 MG/24HR vaginal ring 3 each 3     Sig: Place 1 each vaginally every 21 days Insert one (1) ring vaginally and leave in place for three (3) weeks, then remove for one (1) week.             Ida Feliz LPN

## 2020-05-18 RX ORDER — ETONOGESTREL AND ETHINYL ESTRADIOL 11.7; 2.7 MG/1; MG/1
1 INSERT, EXTENDED RELEASE VAGINAL
Qty: 3 EACH | Refills: 3 | Status: SHIPPED | OUTPATIENT
Start: 2020-05-18 | End: 2020-11-06 | Stop reason: SDUPTHER

## 2020-06-03 ENCOUNTER — OFFICE VISIT (OUTPATIENT)
Dept: URGENT CARE | Age: 20
End: 2020-06-03
Payer: COMMERCIAL

## 2020-06-03 VITALS
SYSTOLIC BLOOD PRESSURE: 184 MMHG | DIASTOLIC BLOOD PRESSURE: 84 MMHG | RESPIRATION RATE: 20 BRPM | OXYGEN SATURATION: 98 % | HEIGHT: 67 IN | HEART RATE: 90 BPM | BODY MASS INDEX: 21.79 KG/M2 | TEMPERATURE: 98.2 F | WEIGHT: 138.8 LBS

## 2020-06-03 LAB
APPEARANCE FLUID: ABNORMAL
BILIRUBIN, POC: NEGATIVE
BLOOD URINE, POC: ABNORMAL
CLARITY, POC: CLEAR
COLOR, POC: ABNORMAL
CONTROL: NORMAL
GLUCOSE URINE, POC: NEGATIVE
KETONES, POC: NEGATIVE
LEUKOCYTE EST, POC: ABNORMAL
NITRITE, POC: POSITIVE
PH, POC: 6
PREGNANCY TEST URINE, POC: NEGATIVE
PROTEIN, POC: NEGATIVE
SPECIFIC GRAVITY, POC: <=1.005
UROBILINOGEN, POC: 0.2

## 2020-06-03 PROCEDURE — 99213 OFFICE O/P EST LOW 20 MIN: CPT | Performed by: NURSE PRACTITIONER

## 2020-06-03 PROCEDURE — 81025 URINE PREGNANCY TEST: CPT | Performed by: NURSE PRACTITIONER

## 2020-06-03 PROCEDURE — 81002 URINALYSIS NONAUTO W/O SCOPE: CPT | Performed by: NURSE PRACTITIONER

## 2020-06-03 RX ORDER — NITROFURANTOIN 25; 75 MG/1; MG/1
100 CAPSULE ORAL 2 TIMES DAILY
Qty: 10 CAPSULE | Refills: 0 | Status: SHIPPED | OUTPATIENT
Start: 2020-06-03 | End: 2020-06-05

## 2020-06-03 ASSESSMENT — ENCOUNTER SYMPTOMS
ABDOMINAL PAIN: 0
NAUSEA: 0
VOMITING: 0
ABDOMINAL DISTENTION: 0
EYES NEGATIVE: 1
ALLERGIC/IMMUNOLOGIC NEGATIVE: 1
SHORTNESS OF BREATH: 0
CONSTIPATION: 0

## 2020-06-03 NOTE — PROGRESS NOTES
[address] [id number]  Dept: [phone]  Dept Fax: [phone]  Loc: [phone]     Georgia Moore is a 23 y.o. female who presents today for her medical conditions/complaintsas noted below. Georgia Moore is c/o of Dysuria        HPI:     HPI     Dysuria   This is a new problem. The current episode started in the past 7 days. The problem occurs every urination. The problem has been unchanged. The quality of the pain is described as burning. The pain is at a severity of 5/10. The pain is moderate. There has been no fever. There is no history of pyelonephritis. Associated symptoms include frequency and urgency. Pertinent negatives include no chills, discharge, flank pain, hematuria, hesitancy, nausea, possible pregnancy, sweats or vomiting. She has tried increased fluids for the symptoms. The treatment provided no relief.  .  Results for orders placed or performed in visit on 06/03/20   POCT Urinalysis no Micro   Result Value Ref Range    Color, UA Ornage     Clarity, UA Clear     Glucose, UA POC Negative     Bilirubin, UA Negative     Ketones, UA Negative     Spec Grav, UA <=1.005     Blood, UA POC Moderate     pH, UA 6.0     Protein, UA POC Negative     Urobilinogen, UA 0.2     Leukocytes, UA Small     Nitrite, UA Positive     Appearance, Fluid     POCT urine pregnancy   Result Value Ref Range    Preg Test, Ur Negative     Control              Results for orders placed or performed in visit on 06/03/20   POCT Urinalysis no Micro   Result Value Ref Range    Color, UA Ornage     Clarity, UA Clear     Glucose, UA POC Negative     Bilirubin, UA Negative     Ketones, UA Negative     Spec Grav, UA <=1.005     Blood, UA POC Moderate     pH, UA 6.0     Protein, UA POC Negative     Urobilinogen, UA 0.2     Leukocytes, UA Small     Nitrite, UA Positive     Appearance, Fluid     POCT urine pregnancy   Result Value Ref Range    Preg abdominal pain, constipation, nausea and vomiting. Genitourinary: Positive for frequency, hematuria and urgency. Negative for dysuria, flank pain, pelvic pain, vaginal bleeding, vaginal discharge and vaginal pain. Musculoskeletal: Negative. Skin: Negative. Allergic/Immunologic: Negative. Neurological: Negative. Hematological: Negative. Psychiatric/Behavioral: Negative. Objective:     Physical Exam  Vitals signs reviewed. Constitutional:       Appearance: She is well-developed. HENT:      Head: Normocephalic. Eyes:      Conjunctiva/sclera: Conjunctivae normal.   Cardiovascular:      Rate and Rhythm: Normal rate and regular rhythm. Pulmonary:      Effort: Pulmonary effort is normal.      Breath sounds: Normal breath sounds. Abdominal:      General: Bowel sounds are normal.      Palpations: Abdomen is soft. Tenderness: There is no abdominal tenderness. There is no right CVA tenderness or left CVA tenderness. Skin:     General: Skin is warm and moist.      Capillary Refill: Capillary refill takes less than 2 seconds. Neurological:      Mental Status: She is alert and oriented to person, place, and time. Psychiatric:         Behavior: Behavior normal.         Thought Content: Thought content normal.         Judgment: Judgment normal.       BP (!) 184/84   Pulse 90   Temp 98.2 °F (36.8 °C) (Oral)   Resp 20   Ht 5' 7\" (1.702 m)   Wt 138 lb 12.8 oz (63 kg)   LMP 05/20/2020   SpO2 98%   BMI 21.74 kg/m²     Assessment:          Diagnosis Orders   1. Acute cystitis with hematuria  nitrofurantoin, macrocrystal-monohydrate, (MACROBID) 100 MG capsule   2. Dysuria  POCT Urinalysis no Micro    POCT urine pregnancy       Plan:      Orders Placed This Encounter   Procedures    POCT Urinalysis no Micro    POCT urine pregnancy        No follow-ups on file.     Orders Placed This Encounter   Procedures    POCT Urinalysis no Micro    POCT urine pregnancy     Orders Placed This

## 2020-06-03 NOTE — PATIENT INSTRUCTIONS
1. Take antibiotic as prescribed  2. Will call with results of culture for further treatment recommendations  3. Follow up if symptoms worsen or fail to improve  To prevent UTIs  · Drink plenty of water each day. This helps you urinate often, which clears bacteria from your system. (If you have kidney, heart, or liver disease and have to limit fluids, talk with your doctor before you increase your fluid intake.)  · Urinate when you need to. · Urinate right after you have sex. · Change sanitary pads often. · Avoid douches, bubble baths, feminine hygiene sprays, and other feminine hygiene products that have deodorants.   · After going to the bathroom, wipe from front to back

## 2020-06-05 LAB
ORGANISM: ABNORMAL
URINE CULTURE, ROUTINE: ABNORMAL
URINE CULTURE, ROUTINE: ABNORMAL

## 2020-06-05 RX ORDER — SULFAMETHOXAZOLE AND TRIMETHOPRIM 800; 160 MG/1; MG/1
1 TABLET ORAL 2 TIMES DAILY
Qty: 14 TABLET | Refills: 0 | Status: SHIPPED | OUTPATIENT
Start: 2020-06-05 | End: 2020-06-12

## 2020-09-10 ENCOUNTER — TELEPHONE (OUTPATIENT)
Dept: FAMILY MEDICINE CLINIC | Age: 20
End: 2020-09-10

## 2020-09-10 NOTE — TELEPHONE ENCOUNTER
Can you call and see if she can come in today at 2:30 or 4:15 instead of tomorrow?   Lots of cancellations today now

## 2020-09-11 ENCOUNTER — OFFICE VISIT (OUTPATIENT)
Dept: FAMILY MEDICINE CLINIC | Age: 20
End: 2020-09-11
Payer: COMMERCIAL

## 2020-09-11 VITALS
HEIGHT: 68 IN | TEMPERATURE: 98.3 F | DIASTOLIC BLOOD PRESSURE: 62 MMHG | HEART RATE: 99 BPM | WEIGHT: 142.13 LBS | SYSTOLIC BLOOD PRESSURE: 108 MMHG | OXYGEN SATURATION: 99 % | BODY MASS INDEX: 21.54 KG/M2

## 2020-09-11 DIAGNOSIS — R10.13 EPIGASTRIC PAIN: ICD-10-CM

## 2020-09-11 DIAGNOSIS — R10.11 RIGHT UPPER QUADRANT ABDOMINAL PAIN: ICD-10-CM

## 2020-09-11 LAB
ALBUMIN SERPL-MCNC: 4.7 G/DL (ref 3.5–5.2)
ALP BLD-CCNC: 91 U/L (ref 35–104)
ALT SERPL-CCNC: 18 U/L (ref 5–33)
ANION GAP SERPL CALCULATED.3IONS-SCNC: 14 MMOL/L (ref 7–19)
AST SERPL-CCNC: 17 U/L (ref 5–32)
BASOPHILS ABSOLUTE: 0 K/UL (ref 0–0.2)
BASOPHILS RELATIVE PERCENT: 0.5 % (ref 0–1)
BILIRUB SERPL-MCNC: 1.6 MG/DL (ref 0.2–1.2)
BUN BLDV-MCNC: 10 MG/DL (ref 6–20)
C-REACTIVE PROTEIN: 0.12 MG/DL (ref 0–0.5)
CALCIUM SERPL-MCNC: 9.4 MG/DL (ref 8.6–10)
CHLORIDE BLD-SCNC: 99 MMOL/L (ref 98–111)
CO2: 28 MMOL/L (ref 22–29)
CREAT SERPL-MCNC: 0.7 MG/DL (ref 0.5–0.9)
EOSINOPHILS ABSOLUTE: 0.1 K/UL (ref 0–0.6)
EOSINOPHILS RELATIVE PERCENT: 0.9 % (ref 0–5)
GAMMA GLUTAMYL TRANSFERASE: 12 U/L (ref 7–54)
GFR AFRICAN AMERICAN: >59
GFR NON-AFRICAN AMERICAN: >60
GLUCOSE BLD-MCNC: 84 MG/DL (ref 74–109)
HCT VFR BLD CALC: 46.2 % (ref 37–47)
HEMOGLOBIN: 15.1 G/DL (ref 12–16)
IMMATURE GRANULOCYTES #: 0 K/UL
LYMPHOCYTES ABSOLUTE: 1.8 K/UL (ref 1.1–4.5)
LYMPHOCYTES RELATIVE PERCENT: 31 % (ref 20–40)
MCH RBC QN AUTO: 29.2 PG (ref 27–31)
MCHC RBC AUTO-ENTMCNC: 32.7 G/DL (ref 33–37)
MCV RBC AUTO: 89.2 FL (ref 81–99)
MONOCYTES ABSOLUTE: 0.4 K/UL (ref 0–0.9)
MONOCYTES RELATIVE PERCENT: 7.2 % (ref 0–10)
NEUTROPHILS ABSOLUTE: 3.5 K/UL (ref 1.5–7.5)
NEUTROPHILS RELATIVE PERCENT: 60.1 % (ref 50–65)
PDW BLD-RTO: 12.9 % (ref 11.5–14.5)
PLATELET # BLD: 268 K/UL (ref 130–400)
PMV BLD AUTO: 11.8 FL (ref 9.4–12.3)
POTASSIUM SERPL-SCNC: 3.9 MMOL/L (ref 3.5–5)
RBC # BLD: 5.18 M/UL (ref 4.2–5.4)
SODIUM BLD-SCNC: 141 MMOL/L (ref 136–145)
TOTAL PROTEIN: 7.5 G/DL (ref 6.6–8.7)
WBC # BLD: 5.8 K/UL (ref 4.8–10.8)

## 2020-09-11 PROCEDURE — 99214 OFFICE O/P EST MOD 30 MIN: CPT | Performed by: INTERNAL MEDICINE

## 2020-09-11 RX ORDER — SUCRALFATE ORAL 1 G/10ML
1 SUSPENSION ORAL
Qty: 1200 ML | Refills: 1 | Status: SHIPPED | OUTPATIENT
Start: 2020-09-11

## 2020-09-11 RX ORDER — OMEPRAZOLE 20 MG/1
20 CAPSULE, DELAYED RELEASE ORAL
Qty: 30 CAPSULE | Refills: 1 | Status: SHIPPED | OUTPATIENT
Start: 2020-09-11 | End: 2020-10-05

## 2020-09-11 ASSESSMENT — ENCOUNTER SYMPTOMS
BLOOD IN STOOL: 0
ABDOMINAL PAIN: 1
COUGH: 0
EYE DISCHARGE: 0
RHINORRHEA: 0
VOICE CHANGE: 0
SINUS PRESSURE: 0
VOMITING: 0
NAUSEA: 0
EYE REDNESS: 0
COLOR CHANGE: 0
CONSTIPATION: 0
DIARRHEA: 0
EYE PAIN: 0
CHEST TIGHTNESS: 0
WHEEZING: 0
ABDOMINAL DISTENTION: 0
SHORTNESS OF BREATH: 0
ANAL BLEEDING: 0
SORE THROAT: 0

## 2020-09-11 ASSESSMENT — PATIENT HEALTH QUESTIONNAIRE - PHQ9
1. LITTLE INTEREST OR PLEASURE IN DOING THINGS: 0
SUM OF ALL RESPONSES TO PHQ9 QUESTIONS 1 & 2: 0
SUM OF ALL RESPONSES TO PHQ QUESTIONS 1-9: 0
2. FEELING DOWN, DEPRESSED OR HOPELESS: 0
SUM OF ALL RESPONSES TO PHQ QUESTIONS 1-9: 0

## 2020-09-11 NOTE — PROGRESS NOTES
Petty Liu is a 23 y.o. female who presents today for   Chief Complaint   Patient presents with    Abdominal Pain     off and on 2 months       HPI  22 y/o WF here with c/o RUQ and sometimes MADAN pain that radiates to her back after she eats for the past 2 mths. She has not had nausea or vomiting. She has not had any constipation or diarrhea and no blood in stools. No fevers. She has been taking pepcid or ranitidine for the past week after she eats but not every time which helps some with the pain. No abnormal weight loss or fevers. She has not been taking any oral NSAIDs. She had a laparscopic cholecystectomy on 11/22/19 and she is worried she may have a stone in her bile duct. Review of Systems   Constitutional: Negative for appetite change, chills, diaphoresis, fatigue, fever and unexpected weight change. HENT: Negative for ear pain, rhinorrhea, sinus pressure, sore throat and voice change. Eyes: Negative for pain, discharge and redness. Respiratory: Negative for cough, chest tightness, shortness of breath and wheezing. Cardiovascular: Negative for chest pain and palpitations. Gastrointestinal: Positive for abdominal pain. Negative for abdominal distention, anal bleeding, blood in stool, constipation, diarrhea, nausea and vomiting. See HPI   Endocrine: Negative for cold intolerance, heat intolerance and polydipsia. Genitourinary: Negative for dysuria and hematuria. Musculoskeletal: Negative for arthralgias, myalgias, neck pain and neck stiffness. Skin: Negative for color change and rash. Neurological: Negative for dizziness, tremors, syncope, speech difficulty, weakness, numbness and headaches. Hematological: Negative for adenopathy. Does not bruise/bleed easily. Psychiatric/Behavioral: Negative for confusion, dysphoric mood and sleep disturbance. The patient is not nervous/anxious. All other systems reviewed and are negative.       Past Medical History:   Diagnosis Date  Foot fracture, right        Current Outpatient Medications   Medication Sig Dispense Refill    omeprazole (PRILOSEC) 20 MG delayed release capsule Take 1 capsule by mouth every morning (before breakfast) 30 capsule 1    sucralfate (CARAFATE) 1 GM/10ML suspension Take 10 mLs by mouth 4 times daily (after meals and at bedtime) 1200 mL 1    etonogestrel-ethinyl estradiol (NUVARING) 0.12-0.015 MG/24HR vaginal ring Place 1 each vaginally every 21 days Insert one (1) ring vaginally and leave in place for three (3) weeks, then remove for one (1) week. 3 each 3     No current facility-administered medications for this visit. No Known Allergies    Past Surgical History:   Procedure Laterality Date    CHOLECYSTECTOMY, LAPAROSCOPIC N/A 11/22/2019    SINGLE-SITE ROBOTIC ASSISTED LAPAROSCOPIC CHOLECYSTECTOMY performed by Anabel Beck DO at [facility] OR       Social History     Tobacco Use    Smoking status: Never Smoker    Smokeless tobacco: Never Used   Substance Use Topics    Alcohol use: No    Drug use: No       Family History   Problem Relation Age of Onset    Diabetes Paternal Grandfather     Anxiety Disorder Father     Bipolar Disorder Father     Anxiety Disorder Sister     Other Sister         mood disorder       /62   Pulse 99   Temp 98.3 °F (36.8 °C)   Ht 5' 8\" (1.727 m)   Wt 142 lb 2 oz (64.5 kg)   SpO2 99%   BMI 21.61 kg/m²     Physical Exam  Vitals signs reviewed. Constitutional:       General: She is not in acute distress. Appearance: Normal appearance. She is well-developed and normal weight. She is not ill-appearing or toxic-appearing. HENT:      Head: Normocephalic and atraumatic. Right Ear: Tympanic membrane, ear canal and external ear normal.      Left Ear: Tympanic membrane, ear canal and external ear normal.      Nose: Nose normal.      Mouth/Throat:      Lips: Pink. Mouth: Mucous membranes are moist. No oral lesions. Tongue: No lesions.       Palate: No suspension   3. Serum total bilirubin elevated  R17 US LIVER       Plan:  Julio Woodard was seen today for abdominal pain. Diagnoses and all orders for this visit:    Right upper quadrant abdominal pain  -     CBC Auto Differential; Future  -     Comprehensive Metabolic Panel; Future  -     C-Reactive Protein; Future  -     Gamma GT; Future  -     US LIVER; Future    Epigastric pain  -     CBC Auto Differential; Future  -     Comprehensive Metabolic Panel; Future  -     C-Reactive Protein; Future  -     Gamma GT; Future  -     omeprazole (PRILOSEC) 20 MG delayed release capsule; Take 1 capsule by mouth every morning (before breakfast)  -     sucralfate (CARAFATE) 1 GM/10ML suspension; Take 10 mLs by mouth 4 times daily (after meals and at bedtime)    Serum total bilirubin elevated  -     US LIVER; Future    1. Right upper quadrant and epigastric pain-no signs of infection or reproducible pain to indicate pancreatitis or bowel obstruction today. Will start Prilosec 20 mg daily with Carafate prior to meals and at bedtime as needed for midepigastric pain. Recommended patient avoid taking ranitidine given it was recalled due to risk of cancer. Patient voiced understanding. 2.  Right upper quadrant abdominal pain and mildly elevated total bilirubin level-we will check liver ultrasound to make sure there is no signs of obstruction or stricture in the common bile duct and pancreatic duct status post cholecystectomy. 3. Return if symptoms worsen or fail to improve. Over 50% of the total visit time of 25 min was spent on counseling and/or coordination of care of:   1. Right upper quadrant abdominal pain    2. Epigastric pain    3.  Serum total bilirubin elevated         Orders Placed This Encounter   Procedures    US LIVER     Standing Status:   Future     Standing Expiration Date:   9/11/2021     Order Specific Question:   Reason for exam:     Answer:   RUQ pain x2 mths with hx cholecystectomy 2019 and elevated bilirubin level    CBC Auto Differential     Standing Status:   Future     Number of Occurrences:   1     Standing Expiration Date:   9/11/2021    Comprehensive Metabolic Panel     Standing Status:   Future     Number of Occurrences:   1     Standing Expiration Date:   9/11/2021    C-Reactive Protein     Standing Status:   Future     Number of Occurrences:   1     Standing Expiration Date:   9/11/2021    Gamma GT     Standing Status:   Future     Number of Occurrences:   1     Standing Expiration Date:   9/11/2021     Orders Placed This Encounter   Medications    omeprazole (PRILOSEC) 20 MG delayed release capsule     Sig: Take 1 capsule by mouth every morning (before breakfast)     Dispense:  30 capsule     Refill:  1    sucralfate (CARAFATE) 1 GM/10ML suspension     Sig: Take 10 mLs by mouth 4 times daily (after meals and at bedtime)     Dispense:  1200 mL     Refill:  1     There are no discontinued medications. There are no Patient Instructions on file for this visit. Patient voices understanding and agrees to plans along with risks and benefits of plan. Counseling:  Yoli Stevenson's case, medications and options were discussed in detail. patient was instructed to call the office if she   questions regarding her treatment. Should her conditions worsen, she should return to office to be reassessed by Dr. Alaina Kim. she  Should to go the closest Emergency Department for any emergency. They verbalized understanding the above instructions.

## 2020-10-05 RX ORDER — OMEPRAZOLE 20 MG/1
CAPSULE, DELAYED RELEASE ORAL
Qty: 30 CAPSULE | Refills: 1 | Status: SHIPPED | OUTPATIENT
Start: 2020-10-05 | End: 2020-12-27 | Stop reason: SDUPTHER

## 2020-10-05 NOTE — TELEPHONE ENCOUNTER
Cristobal Beckie called to request a refill on her medication.       Last office visit : 9/11/2020   Next office visit : 12/10/2020     Requested Prescriptions     Pending Prescriptions Disp Refills    omeprazole (PRILOSEC) 20 MG delayed release capsule [Pharmacy Med Name: OMEPRAZOLE DR 20 MG CAPSULE] 30 capsule 1     Sig: TAKE 1 CAPSULE BY MOUTH EVERY DAY IN THE MORNING BEFORE BREAKFAST            Rj Urias [place]

## 2020-10-09 ENCOUNTER — HOSPITAL ENCOUNTER (OUTPATIENT)
Dept: ULTRASOUND IMAGING | Age: 20
Discharge: HOME | End: 2020-10-09
Payer: COMMERCIAL

## 2020-10-09 PROCEDURE — 76705 ECHO EXAM OF ABDOMEN: CPT

## 2020-10-19 RX ORDER — SUCRALFATE 1 G/1
1 TABLET ORAL 4 TIMES DAILY
Qty: 120 TABLET | Refills: 3 | Status: SHIPPED | OUTPATIENT
Start: 2020-10-19 | End: 2021-02-22 | Stop reason: ALTCHOICE

## 2020-10-19 NOTE — TELEPHONE ENCOUNTER
Roland Wilson called to request a refill on her medication.       Last office visit : 9/11/2020   Next office visit : 12/10/2020     Requested Prescriptions     Pending Prescriptions Disp Refills    sucralfate (CARAFATE) 1 GM tablet 120 tablet 3     Sig: Take 1 tablet by mouth 4 times daily            Hermilo Jameson MA

## 2020-11-06 RX ORDER — ETONOGESTREL AND ETHINYL ESTRADIOL 11.7; 2.7 MG/1; MG/1
1 INSERT, EXTENDED RELEASE VAGINAL
Qty: 3 EACH | Refills: 3 | Status: SHIPPED | OUTPATIENT
Start: 2020-11-06 | End: 2020-12-28 | Stop reason: SDUPTHER

## 2020-12-27 RX ORDER — ETONOGESTREL AND ETHINYL ESTRADIOL 11.7; 2.7 MG/1; MG/1
1 INSERT, EXTENDED RELEASE VAGINAL
Qty: 3 EACH | Refills: 3 | Status: CANCELLED | OUTPATIENT
Start: 2020-12-27

## 2020-12-28 RX ORDER — ETONOGESTREL AND ETHINYL ESTRADIOL 11.7; 2.7 MG/1; MG/1
1 INSERT, EXTENDED RELEASE VAGINAL
Qty: 3 EACH | Refills: 3 | Status: SHIPPED | OUTPATIENT
Start: 2020-12-28 | End: 2021-12-16

## 2020-12-28 RX ORDER — OMEPRAZOLE 20 MG/1
20 CAPSULE, DELAYED RELEASE ORAL DAILY
Qty: 30 CAPSULE | Refills: 1 | Status: SHIPPED | OUTPATIENT
Start: 2020-12-28 | End: 2021-01-06

## 2020-12-28 NOTE — TELEPHONE ENCOUNTER
Иван Vigilfaustina called to request a refill on her medication. Last office visit : 12/10/2020   Next office visit : Visit date not found     Requested Prescriptions     Pending Prescriptions Disp Refills    omeprazole (PRILOSEC) 20 MG delayed release capsule 30 capsule 1    etonogestrel-ethinyl estradiol (NUVARING) 0.12-0.015 MG/24HR vaginal ring 3 each 3     Sig: Place 1 each vaginally every 21 days Insert one (1) ring vaginally and leave in place for three (3) weeks, then remove for one (1) week.             Abeba Carcamo, [address] [place] [address]

## 2021-01-06 ENCOUNTER — VIRTUAL VISIT (OUTPATIENT)
Dept: GASTROENTEROLOGY | Age: 21
End: 2021-01-06
Payer: COMMERCIAL

## 2021-01-06 DIAGNOSIS — R10.13 EPIGASTRIC PAIN: Primary | ICD-10-CM

## 2021-01-06 DIAGNOSIS — K21.9 GASTROESOPHAGEAL REFLUX DISEASE, UNSPECIFIED WHETHER ESOPHAGITIS PRESENT: ICD-10-CM

## 2021-01-06 PROCEDURE — 99204 OFFICE O/P NEW MOD 45 MIN: CPT | Performed by: NURSE PRACTITIONER

## 2021-01-06 RX ORDER — OMEPRAZOLE 40 MG/1
40 CAPSULE, DELAYED RELEASE ORAL DAILY
Qty: 30 CAPSULE | Refills: 2 | Status: SHIPPED | OUTPATIENT
Start: 2021-01-06 | End: 2021-04-19

## 2021-01-06 ASSESSMENT — ENCOUNTER SYMPTOMS
ABDOMINAL DISTENTION: 0
VOMITING: 0
NAUSEA: 0
CONSTIPATION: 0
SHORTNESS OF BREATH: 0
RECTAL PAIN: 0
ABDOMINAL PAIN: 1
BLOOD IN STOOL: 0
COUGH: 0
ANAL BLEEDING: 0
TROUBLE SWALLOWING: 1
CHOKING: 0
DIARRHEA: 0

## 2021-01-06 NOTE — PROGRESS NOTES
2021    TELEHEALTH EVALUATION -- Audio/Visual (During BOJG[facility]-69 public health emergency)    HPI:    Dia Keareny (:  2000) has requested an audio/video evaluation for the following concern(s):  Chief Complaint   Patient presents with    Abdominal Pain     epigastric         GERD  Paitent complains of heartburn/reflux. Symptoms have been present for about a year. Symptoms occur irregularly and have worsened. This has been associated with fullness after meals, epigastric pain and epigastric fullness. She reports one instance of feeling like pills got stuck with pain for 3-4 days. She denies hematemesis and melena. Medical therapy in the past has included proton pump inhibitors. She is currently taking Omeprazole 20 mg po daily and Carafate 1 gm TID. She has used antacids prn. Review of Systems   Constitutional: Negative for activity change, appetite change, fatigue, fever and unexpected weight change. HENT: Positive for trouble swallowing. Respiratory: Negative for cough, choking and shortness of breath. Cardiovascular: Negative for chest pain. Gastrointestinal: Positive for abdominal pain (epigastric). Negative for abdominal distention, anal bleeding, blood in stool, constipation, diarrhea, nausea, rectal pain and vomiting. Allergic/Immunologic: Negative for food allergies. All other systems reviewed and are negative. Prior to Visit Medications    Medication Sig Taking? Authorizing Provider   omeprazole (PRILOSEC) 40 MG delayed release capsule Take 1 capsule by mouth Daily Yes RANDAL Arita - NP   etonogestrel-ethinyl estradiol (NUVARING) 0.12-0.015 MG/24HR vaginal ring Place 1 each vaginally every 21 days Insert one (1) ring vaginally and leave in place for three (3) weeks, then remove for one (1) week.   Jake Willson MD   sucralfate (CARAFATE) 1 GM tablet Take 1 tablet by mouth 4 times daily  Jake Willson MD sucralfate (CARAFATE) 1 GM/10ML suspension Take 10 mLs by mouth 4 times daily (after meals and at bedtime)  Gearortega Cardona, MD       Social History     Tobacco Use    Smoking status: Never Smoker    Smokeless tobacco: Never Used   Substance Use Topics    Alcohol use: No    Drug use: No        No Known Allergies,   Past Medical History:   Diagnosis Date    Foot fracture, right    ,   Past Surgical History:   Procedure Laterality Date    CHOLECYSTECTOMY, LAPAROSCOPIC N/A 11/22/2019    SINGLE-SITE ROBOTIC ASSISTED LAPAROSCOPIC CHOLECYSTECTOMY performed by Rice Litten, DO at [facility] OR   ,   Social History     Tobacco Use    Smoking status: Never Smoker    Smokeless tobacco: Never Used   Substance Use Topics    Alcohol use: No    Drug use: No   ,   Family History   Problem Relation Age of Onset    Diabetes Paternal Grandfather     Anxiety Disorder Father     Bipolar Disorder Father     Anxiety Disorder Sister     Other Sister         mood disorder       PHYSICAL EXAMINATION:  [ INSTRUCTIONS:  \"[x]\" Indicates a positive item  \"[]\" Indicates a negative item  -- DELETE ALL ITEMS NOT EXAMINED]  Vital Signs: (As obtained by patient/caregiver or practitioner observation)    Blood pressure-  Heart rate-    Respiratory rate-    Temperature-  Pulse oximetry-     Constitutional: [x] Appears well-developed and well-nourished [x] No apparent distress      [] Abnormal-   Mental status  [x] Alert and awake  [x] Oriented to person/place/time [x]Able to follow commands      Eyes:  EOM    [x]  Normal  [] Abnormal-  Sclera  [x]  Normal  [] Abnormal -         Discharge [x]  None visible  [] Abnormal -    HENT:   [x] Normocephalic, atraumatic.   [] Abnormal   [x] Mouth/Throat: Mucous membranes are moist.     External Ears [x] Normal  [] Abnormal-     Neck: [x] No visualized mass     Pulmonary/Chest: [x] Respiratory effort normal.  [x] No visualized signs of difficulty breathing or respiratory distress [] Abnormal-      Musculoskeletal:   [x] Normal gait with no signs of ataxia         [x] Normal range of motion of neck        [] Abnormal-       Neurological:        [x] No Facial Asymmetry (Cranial nerve 7 motor function) (limited exam to video visit)          [x] No gaze palsy        [] Abnormal-         Skin:        [x] No significant exanthematous lesions or discoloration noted on facial skin         [] Abnormal-            Psychiatric:       [x] Normal Affect [x] No Hallucinations        [] Abnormal-     Other pertinent observable physical exam findings-     ASSESSMENT/PLAN:  1. Epigastric pain  2. Gastroesophageal reflux disease, unspecified whether esophagitis present    - Increase Omeprazole to 40 mg po daily  - Continue Carafate  - Avoid NSAIDs  - Follow up in 6-8 weeks or sooner if needed  - Schedule EGD  Nothing to eat or drink after midnight. No driving for 24 hours after procedure. Bring a  to procedure. No aspirin, NSAIDs, fish oil 5 days before procedure. I have discussed the benefits, alternatives, and risks (including bleeding, perforation and death)  for pursuing Endoscopy (EGD/Colonscopy/EUS/ERCP) with the patient and they are willing to continue. We also discussed the need for anesthesia, IV access, proper dietary changes, medication changes if necessary, and need for bowel prep (if ordered) prior to their Endoscopic procedure. They are aware they must have someone accompany them to their scheduled procedure to drive them home - they agree to the above and are willing to continue. Return for procedure follow up or sooner if needed. Chciho Song is a 23 y.o. female being evaluated by a Virtual Visit (video visit) encounter to address concerns as mentioned above. A caregiver was present when appropriate. Due to this being a TeleHealth encounter (During [facility] public health emergency), evaluation of the following organ systems was limited: Vitals/Constitutional/EENT/Resp/CV/GI//MS/Neuro/Skin/Heme-Lymph-Imm. Pursuant to the emergency declaration under the [address] [place] [address] and the [employer] and [employer] Act, this Virtual Visit was conducted with patient's (and/or legal guardian's) consent, to reduce the patient's risk of exposure to COVID-19 and provide necessary medical care. The patient (and/or legal guardian) has also been advised to contact this office for worsening conditions or problems, and seek emergency medical treatment and/or call 911 if deemed necessary. Patient identification was verified at the start of the visit: Yes    Total time spent on this encounter: Not billed by time    Services were provided through a video synchronous discussion virtually to substitute for in-person clinic visit. Patient and provider were located at their individual homes. --RANDAL Hernandez NP on 1/6/2021 at 10:05 AM    An electronic signature was used to authenticate this note.

## 2021-01-07 ENCOUNTER — IMMUNIZATION (OUTPATIENT)
Dept: VACCINE CLINIC | Facility: HOSPITAL | Age: 21
End: 2021-01-07

## 2021-01-07 PROCEDURE — 91301 HC SARSCO02 VAC 100MCG/0.5ML IM: CPT | Performed by: OBSTETRICS & GYNECOLOGY

## 2021-01-07 PROCEDURE — 0011A: CPT | Performed by: OBSTETRICS & GYNECOLOGY

## 2021-01-08 ENCOUNTER — APPOINTMENT (OUTPATIENT)
Dept: VACCINE CLINIC | Facility: HOSPITAL | Age: 21
End: 2021-01-08

## 2021-01-11 ENCOUNTER — OFFICE VISIT (OUTPATIENT)
Age: 21
End: 2021-01-11

## 2021-01-11 VITALS — HEART RATE: 85 BPM | OXYGEN SATURATION: 100 % | TEMPERATURE: 97 F

## 2021-01-11 DIAGNOSIS — Z11.59 SCREENING FOR VIRAL DISEASE: Primary | ICD-10-CM

## 2021-01-11 PROCEDURE — 99999 PR OFFICE/OUTPT VISIT,PROCEDURE ONLY: CPT | Performed by: NURSE PRACTITIONER

## 2021-01-12 LAB — SARS-COV-2, NAA: NOT DETECTED

## 2021-01-15 ENCOUNTER — APPOINTMENT (OUTPATIENT)
Dept: OPERATING ROOM | Age: 21
End: 2021-01-15

## 2021-01-15 ENCOUNTER — ANESTHESIA (OUTPATIENT)
Dept: OPERATING ROOM | Age: 21
End: 2021-01-15

## 2021-01-15 ENCOUNTER — ANESTHESIA EVENT (OUTPATIENT)
Dept: OPERATING ROOM | Age: 21
End: 2021-01-15

## 2021-01-15 ENCOUNTER — HOSPITAL ENCOUNTER (OUTPATIENT)
Age: 21
Setting detail: SPECIMEN
Discharge: HOME | End: 2021-01-15
Payer: COMMERCIAL

## 2021-01-15 ENCOUNTER — HOSPITAL ENCOUNTER (OUTPATIENT)
Age: 21
Setting detail: OUTPATIENT SURGERY
Discharge: HOME | End: 2021-01-15
Attending: INTERNAL MEDICINE | Admitting: INTERNAL MEDICINE
Payer: COMMERCIAL

## 2021-01-15 VITALS — SYSTOLIC BLOOD PRESSURE: 104 MMHG | OXYGEN SATURATION: 98 % | DIASTOLIC BLOOD PRESSURE: 62 MMHG

## 2021-01-15 VITALS
WEIGHT: 145 LBS | OXYGEN SATURATION: 98 % | TEMPERATURE: 98.4 F | SYSTOLIC BLOOD PRESSURE: 112 MMHG | HEART RATE: 94 BPM | BODY MASS INDEX: 22.76 KG/M2 | HEIGHT: 67 IN | DIASTOLIC BLOOD PRESSURE: 69 MMHG | RESPIRATION RATE: 16 BRPM

## 2021-01-15 PROCEDURE — 43239 EGD BIOPSY SINGLE/MULTIPLE: CPT | Performed by: INTERNAL MEDICINE

## 2021-01-15 PROCEDURE — 43239 EGD BIOPSY SINGLE/MULTIPLE: CPT

## 2021-01-15 PROCEDURE — 88305 TISSUE EXAM BY PATHOLOGIST: CPT

## 2021-01-15 RX ORDER — LIDOCAINE HYDROCHLORIDE 10 MG/ML
INJECTION, SOLUTION INFILTRATION; PERINEURAL PRN
Status: DISCONTINUED | OUTPATIENT
Start: 2021-01-15 | End: 2021-01-15 | Stop reason: SDUPTHER

## 2021-01-15 RX ORDER — SODIUM CHLORIDE 9 MG/ML
INJECTION, SOLUTION INTRAVENOUS CONTINUOUS
Status: DISCONTINUED | OUTPATIENT
Start: 2021-01-15 | End: 2021-01-15 | Stop reason: HOSPADM

## 2021-01-15 RX ORDER — PROPOFOL 10 MG/ML
INJECTION, EMULSION INTRAVENOUS PRN
Status: DISCONTINUED | OUTPATIENT
Start: 2021-01-15 | End: 2021-01-15 | Stop reason: SDUPTHER

## 2021-01-15 RX ADMIN — LIDOCAINE HYDROCHLORIDE 50 MG: 10 INJECTION, SOLUTION INFILTRATION; PERINEURAL at 11:18

## 2021-01-15 RX ADMIN — PROPOFOL 50 MG: 10 INJECTION, EMULSION INTRAVENOUS at 11:18

## 2021-01-15 RX ADMIN — PROPOFOL 50 MG: 10 INJECTION, EMULSION INTRAVENOUS at 11:22

## 2021-01-15 RX ADMIN — PROPOFOL 50 MG: 10 INJECTION, EMULSION INTRAVENOUS at 11:25

## 2021-01-15 RX ADMIN — PROPOFOL 50 MG: 10 INJECTION, EMULSION INTRAVENOUS at 11:28

## 2021-01-15 RX ADMIN — SODIUM CHLORIDE: 9 INJECTION, SOLUTION INTRAVENOUS at 10:23

## 2021-01-15 NOTE — ANESTHESIA PRE PROCEDURE
 Alcohol use: No                                Counseling given: Not Answered      Vital Signs (Current):   Vitals:    01/15/21 1018 01/15/21 1019   BP: 130/80    Pulse: 109    Resp: 16    Temp:  98.4 °F (36.9 °C)   SpO2: 100%    Weight: 145 lb (65.8 kg)    Height: 5' 7\" (1.702 m)                                               BP Readings from Last 3 Encounters:   01/15/21 130/80   09/11/20 108/62   06/03/20 (!) 184/84       NPO Status: Time of last liquid consumption: 2300                        Time of last solid consumption: 2300                        Date of last liquid consumption: 01/14/21                        Date of last solid food consumption: 01/14/21    BMI:   Wt Readings from Last 3 Encounters:   01/15/21 145 lb (65.8 kg)   09/11/20 142 lb 2 oz (64.5 kg)   06/03/20 138 lb 12.8 oz (63 kg)     Body mass index is 22.71 kg/m². CBC:   Lab Results   Component Value Date    WBC 5.8 09/11/2020    RBC 5.18 09/11/2020    HGB 15.1 09/11/2020    HCT 46.2 09/11/2020    MCV 89.2 09/11/2020    RDW 12.9 09/11/2020     09/11/2020       CMP:   Lab Results   Component Value Date     09/11/2020    K 3.9 09/11/2020    CL 99 09/11/2020    CO2 28 09/11/2020    BUN 10 09/11/2020    CREATININE 0.7 09/11/2020    GFRAA >59 09/11/2020    LABGLOM >60 09/11/2020    GLUCOSE 84 09/11/2020    PROT 7.5 09/11/2020    CALCIUM 9.4 09/11/2020    BILITOT 1.6 09/11/2020    ALKPHOS 91 09/11/2020    AST 17 09/11/2020    ALT 18 09/11/2020       POC Tests: No results for input(s): POCGLU, POCNA, POCK, POCCL, POCBUN, POCHEMO, POCHCT in the last 72 hours.     Coags: No results found for: PROTIME, INR, APTT    HCG (If Applicable):   Lab Results   Component Value Date    PREGTESTUR Negative 06/03/2020        ABGs: No results found for: PHART, PO2ART, BJW1LHE, MKT9IPN, BEART, R5OBIYIP     Type & Screen (If Applicable):  No results found for: LABABO, LABRH    Drug/Infectious Status (If Applicable):  No results found for: HIV, HEPCAB

## 2021-01-15 NOTE — ANESTHESIA POSTPROCEDURE EVALUATION
Department of Anesthesiology  Postprocedure Note    Patient: Kay Glover  MRN: [id number]  YOB: 2000  Date of evaluation: 1/15/2021  Time:  11:33 AM     Procedure Summary     Date: 01/15/21 Room / Location: [facility] ENDO 02 / [address] [place] [address]    Anesthesia Start: 3492 Anesthesia Stop: 1200    Procedure: EGD BIOPSY (N/A Esophagus) Diagnosis: (EPIG PAIN)    Surgeons: Estela Kruger MD Responsible Provider: RANDAL Blackmon CRNA    Anesthesia Type: general, TIVA ASA Status: 1          Anesthesia Type: general, TIVA    Candelaria Phase I:      Candelaria Phase II:      Last vitals: Reviewed and per EMR flowsheets.        Anesthesia Post Evaluation    Patient location during evaluation: bedside  Patient participation: complete - patient participated  Level of consciousness: awake and alert  Pain score: 0  Airway patency: patent  Nausea & Vomiting: no nausea and no vomiting  Complications: no  Cardiovascular status: blood pressure returned to baseline  Respiratory status: acceptable  Hydration status: stable

## 2021-01-15 NOTE — H&P
Patient Name: Sofy Florian  : 2000  MRN: [id number]  DATE: 01/15/21    Allergies: No Known Allergies     ENDOSCOPY  History and Physical    Procedure:    [] Diagnostic Colonoscopy       [] Screening Colonoscopy  [x] EGD      [] ERCP      [] EUS       [] Other    [x] Previous office notes/History and Physical reviewed from the patients chart. Please see EMR for further details of HPI. I have examined the patient's status immediately prior to the procedure and:      Indications/HPI:    1. Epigastric pain  2. Gastroesophageal reflux disease, unspecified whether esophagitis present     []Abdominal Pain   []Cancer- GI/Lung     []Fhx of colon CA/polyps  []History of Polyps  []Barretts            []Melena  []Abnormal Imaging              []Dysphagia              []Persistent Pneumonia   []Anemia                            []Food Impaction        []History of Polyps  [] GI Bleed             []Pulmonary nodule/Mass   []Change in bowel habits []Heartburn/Reflux  []Rectal Bleed (BRBPR)  []Chest Pain - Non Cardiac []Heme (+) Stool []Ulcers  []Constipation  []Hemoptysis  []Varices  []Diarrhea  []Hypoxemia    []Nausea/Vomiting   []Screening   []Crohns/Colitis  []Other:     Anesthesia:   [x] MAC [] Moderate Sedation   [] General   [] None     ROS: 12 pt Review of Symptoms was negative unless mentioned above    Medications:   Prior to Admission medications    Medication Sig Start Date End Date Taking? Authorizing Provider   omeprazole (PRILOSEC) 40 MG delayed release capsule Take 1 capsule by mouth Daily 21  Yes RANDAL Vaz - YESSENIA   etonogestrel-ethinyl estradiol (NUVARING) 0.12-0.015 MG/24HR vaginal ring Place 1 each vaginally every 21 days Insert one (1) ring vaginally and leave in place for three (3) weeks, then remove for one (1) week.  20  Yes Bart Flowers MD   sucralfate (CARAFATE) 1 GM tablet Take 1 tablet by mouth 4 times daily 10/19/20  Yes Bart Flowers MD sucralfate (CARAFATE) 1 GM/10ML suspension Take 10 mLs by mouth 4 times daily (after meals and at bedtime) 9/11/20   Barrington Madera MD       Past Medical History:  Past Medical History:   Diagnosis Date    Foot fracture, right        Past Surgical History:  Past Surgical History:   Procedure Laterality Date    CHOLECYSTECTOMY, LAPAROSCOPIC N/A 11/22/2019    SINGLE-SITE ROBOTIC ASSISTED LAPAROSCOPIC CHOLECYSTECTOMY performed by Tahir Tate DO at [facility] History:  Social History     Tobacco Use    Smoking status: Never Smoker    Smokeless tobacco: Never Used   Substance Use Topics    Alcohol use: No    Drug use: No       Vital Signs:   Vitals:    01/15/21 1019   BP:    Pulse:    Resp:    Temp: 98.4 °F (36.9 °C)   SpO2:         Physical Exam:  Cardiac:  [x]WNL  []Comments:  Pulmonary:  [x]WNL   []Comments:  Neuro/Mental Status:  [x]WNL  []Comments:  Abdominal:  [x]WNL    []Comments:  Other:   []WNL  []Comments:    Informed Consent:  The risks and benefits of the procedure have been discussed with either the patient or if they cannot consent, their representative. Assessment:  Patient examined and appropriate for planned sedation and procedure. Plan:  Proceed with planned sedation and procedure as above.          Pacheco Marks MD

## 2021-01-15 NOTE — OP NOTE
Endoscopic Procedure Note    Patient: Leretha Moritz : 2000  [facility] Rec#: [id number] Acc#: [id number]     Primary Care Provider Albino Caldwell MD    Endoscopist: Gerardo Cota MD    Date of Procedure:  1/15/2021    Procedure:   1. EGD with cold biopsies    Indications:   1. Epigastric pain  2. Gastroesophageal reflux disease, unspecified whether esophagitis present     Anesthesia:  Sedation was administered by anesthesia who monitored the patient during the procedure. Estimated Blood Loss: minimal    Procedure:   After reviewing the patient's chart and obtaining informed consent, the patient was placed in the left lateral decubitus position. A forward-viewing Olympus endoscope was lubricated and inserted through the mouth into the oropharynx. Under direct visualization, the upper esophagus was intubated. The scope was advanced to the level of the third portion of duodenum. Scope was slowly withdrawn with careful inspection of the mucosal surfaces. The scope was retroflexed for inspection of the gastric fundus and incisura. Findings and maneuvers are listed in impression below. The patient tolerated the procedure well. The scope was removed. There were no immediate complications. Findings/IMPRESSION:  Esophagus: normal; EG junction at 40 cm was normal as well. NO erosions or ulcers or nodules or strictures or webs or rings or mass lesions or extrinsic compression or diverticula noted. There is NO hiatal hernia present. Stomach:  Normal; NO mucosal changes suggestive of gastritis noted. NO ulcers or masses or gastric outlet obstruction or retained food or fluid. Rugae were normal and lumen distended well with insufflation. Retroflexed views otherwise revealed a normal GE junction, fundus and cardia as well. Duodenum: Normal including the major duodenal papilla. Random biopsies were taken to check for Celiac disease and other causes of villous atrophy.

## 2021-02-04 ENCOUNTER — IMMUNIZATION (OUTPATIENT)
Dept: VACCINE CLINIC | Facility: HOSPITAL | Age: 21
End: 2021-02-04

## 2021-02-04 PROCEDURE — 91301 HC SARSCO02 VAC 100MCG/0.5ML IM: CPT | Performed by: OBSTETRICS & GYNECOLOGY

## 2021-02-04 PROCEDURE — 0012A: CPT | Performed by: OBSTETRICS & GYNECOLOGY

## 2021-02-24 ENCOUNTER — VIRTUAL VISIT (OUTPATIENT)
Dept: GASTROENTEROLOGY | Age: 21
End: 2021-02-24
Payer: COMMERCIAL

## 2021-02-24 DIAGNOSIS — R10.13 DYSPEPSIA: Primary | ICD-10-CM

## 2021-02-24 PROCEDURE — 99213 OFFICE O/P EST LOW 20 MIN: CPT | Performed by: NURSE PRACTITIONER

## 2021-02-24 ASSESSMENT — ENCOUNTER SYMPTOMS
BLOOD IN STOOL: 0
ANAL BLEEDING: 0
RECTAL PAIN: 0
ABDOMINAL PAIN: 1
COUGH: 0
DIARRHEA: 0
ABDOMINAL DISTENTION: 0
VOMITING: 0
CONSTIPATION: 0
NAUSEA: 0
TROUBLE SWALLOWING: 0
CHOKING: 0
SHORTNESS OF BREATH: 0

## 2021-02-24 NOTE — PROGRESS NOTES
2021    TELEHEALTH EVALUATION -- Audio/Visual (During ENLYG-00 public health emergency)    HPI:    Charles Evangelista (:  2000) has requested an audio/video evaluation for the following concern(s):  Chief Complaint   Patient presents with    Follow Up After Procedure       Charles Evangelista is here for follow up after EGD on 1/15/2021. During her last visit, she had c/o reflux, epigastric fullness. Today, she reports symptoms have improved. She is currently taking Protonix 40 mg po daily. She continues to have some issues with hiccups and fullness at times. However, this is manageable per patient. EGD Findings/IMPRESSION 1/15/2021:  Esophagus: normal; EG junction at 40 cm was normal as well. NO erosions or ulcers or nodules or strictures or webs or rings or mass lesions or extrinsic compression or diverticula noted. There is NO hiatal hernia present. Stomach:  Normal; NO mucosal changes suggestive of gastritis noted   NO ulcers or masses or gastric outlet obstruction or retained food or fluid. Rugae were normal and lumen distended well with insufflation. Retroflexed views otherwise revealed a normal GE junction, fundus and cardia as well. Duodenum: Normal including the major duodenal papilla. Random biopsies were taken to check for Celiac disease and other causes of villous atrophy. RECOMMENDATIONS:    1. Await path results, the patient will be contacted in 7-10 days with biopsy results. 2.  Continue PPI and anti-GERD measures. - Resume previous meds and diet  - GI clinic f/u 4-6 weeks with MsOlman Chaney scheduled f/u appts with other MDs   - NO ASA/NSAIDs x 2 weeks     FINAL DIAGNOSIS:   Small intestine, duodenal biopsies: Benign duodenal mucosa with focal   mild chronic inflammation and reactive epithelial changes, negative for   evidence of sprue. All scope and pathology reports were reviewed and discussed with patient. All questions answered, pt verbalized understanding. Review of Systems   Constitutional: Negative for activity change, appetite change, fatigue, fever and unexpected weight change. HENT: Negative for trouble swallowing. Respiratory: Negative for cough, choking and shortness of breath. Cardiovascular: Negative for chest pain. Gastrointestinal: Positive for abdominal pain (epigastric fullness - improved). Negative for abdominal distention, anal bleeding, blood in stool, constipation, diarrhea, nausea, rectal pain and vomiting. Allergic/Immunologic: Negative for food allergies. All other systems reviewed and are negative. Prior to Visit Medications    Medication Sig Taking? Authorizing Provider   omeprazole (PRILOSEC) 40 MG delayed release capsule Take 1 capsule by mouth Daily  Dorsey Gowers, APRN - NP   etonogestrel-ethinyl estradiol (NUVARING) 0.12-0.015 MG/24HR vaginal ring Place 1 each vaginally every 21 days Insert one (1) ring vaginally and leave in place for three (3) weeks, then remove for one (1) week.   Leilani Meza MD   sucralfate (CARAFATE) 1 GM/10ML suspension Take 10 mLs by mouth 4 times daily (after meals and at bedtime)  Leilani Meza MD       Social History     Tobacco Use    Smoking status: Never Smoker    Smokeless tobacco: Never Used   Substance Use Topics    Alcohol use: No    Drug use: No        No Known Allergies,   Past Medical History:   Diagnosis Date    Foot fracture, right    ,   Past Surgical History:   Procedure Laterality Date    CHOLECYSTECTOMY, LAPAROSCOPIC N/A 11/22/2019    SINGLE-SITE ROBOTIC ASSISTED LAPAROSCOPIC CHOLECYSTECTOMY performed by Maria Mansfield DO at [facility] ENDOSCOPY N/A 01/15/2021    Dr Keshav Trimble, (-) EMILY, (-)Daphnie   ,   Social History     Tobacco Use    Smoking status: Never Smoker  Smokeless tobacco: Never Used   Substance Use Topics    Alcohol use: No    Drug use: No   ,   Family History   Problem Relation Age of Onset    Diabetes Paternal Grandfather     Anxiety Disorder Father     Bipolar Disorder Father     Anxiety Disorder Sister     Other Sister         mood disorder    Colon Cancer Neg Hx     Esophageal Cancer Neg Hx     Liver Cancer Neg Hx     Rectal Cancer Neg Hx     Stomach Cancer Neg Hx        PHYSICAL EXAMINATION:  [ INSTRUCTIONS:  \"[x]\" Indicates a positive item  \"[]\" Indicates a negative item  -- DELETE ALL ITEMS NOT EXAMINED]  Vital Signs: (As obtained by patient/caregiver or practitioner observation)    Blood pressure-  Heart rate-    Respiratory rate-    Temperature-  Pulse oximetry-     Constitutional: [x] Appears well-developed and well-nourished [x] No apparent distress      [] Abnormal-   Mental status  [x] Alert and awake  [x] Oriented to person/place/time [x]Able to follow commands      Eyes:  EOM    [x]  Normal  [] Abnormal-  Sclera  [x]  Normal  [] Abnormal -         Discharge [x]  None visible  [] Abnormal -    HENT:   [x] Normocephalic, atraumatic.   [] Abnormal   [x] Mouth/Throat: Mucous membranes are moist.     External Ears [x] Normal  [] Abnormal-     Neck: [x] No visualized mass     Pulmonary/Chest: [x] Respiratory effort normal.  [x] No visualized signs of difficulty breathing or respiratory distress        [] Abnormal-      Musculoskeletal:   [x] Normal gait with no signs of ataxia         [x] Normal range of motion of neck        [] Abnormal-       Neurological:        [x] No Facial Asymmetry (Cranial nerve 7 motor function) (limited exam to video visit)          [x] No gaze palsy        [] Abnormal-         Skin:        [x] No significant exanthematous lesions or discoloration noted on facial skin         [] Abnormal-            Psychiatric:       [x] Normal Affect [x] No Hallucinations        [] Abnormal- Other pertinent observable physical exam findings-     ASSESSMENT/PLAN:  1. Dyspepsia    - Continue PPI and Carafate   - Follow up yearly for medication management   - Call with any questions, concerns, or worsening symptoms      Return if symptoms worsen or fail to improve. Carmen Jones is a 23 y.o. female being evaluated by a Virtual Visit (video visit) encounter to address concerns as mentioned above. A caregiver was present when appropriate. Due to this being a TeleHealth encounter (During [facility]-96 public health emergency), evaluation of the following organ systems was limited: Vitals/Constitutional/EENT/Resp/CV/GI//MS/Neuro/Skin/Heme-Lymph-Imm. Pursuant to the emergency declaration under the [address] [place] [address], [address] [place] [address] authority and the [employer] Act, this Virtual Visit was conducted with patient's (and/or legal guardian's) consent, to reduce the patient's risk of exposure to COVID-19 and provide necessary medical care. The patient (and/or legal guardian) has also been advised to contact this office for worsening conditions or problems, and seek emergency medical treatment and/or call 911 if deemed necessary. Patient identification was verified at the start of the visit: Yes    Total time spent on this encounter: Not billed by time    Services were provided through a video synchronous discussion virtually to substitute for in-person clinic visit. Patient and provider were located at their individual homes. --RANDAL Del Rosario - NP on 2/24/2021 at 9:51 AM    An electronic signature was used to authenticate this note.

## 2021-04-17 DIAGNOSIS — R10.13 EPIGASTRIC PAIN: ICD-10-CM

## 2021-04-19 RX ORDER — OMEPRAZOLE 40 MG/1
CAPSULE, DELAYED RELEASE ORAL
Qty: 90 CAPSULE | Refills: 3 | Status: SHIPPED | OUTPATIENT
Start: 2021-04-19 | End: 2022-03-16 | Stop reason: SDUPTHER

## 2021-07-07 ENCOUNTER — OFFICE VISIT (OUTPATIENT)
Dept: URGENT CARE | Age: 21
End: 2021-07-07

## 2021-07-07 VITALS
TEMPERATURE: 97.7 F | BODY MASS INDEX: 21.92 KG/M2 | SYSTOLIC BLOOD PRESSURE: 140 MMHG | DIASTOLIC BLOOD PRESSURE: 98 MMHG | HEART RATE: 100 BPM | HEIGHT: 68 IN | OXYGEN SATURATION: 98 % | RESPIRATION RATE: 16 BRPM | WEIGHT: 144.6 LBS

## 2021-07-07 DIAGNOSIS — H00.024 HORDEOLUM INTERNUM OF LEFT UPPER EYELID: Primary | ICD-10-CM

## 2021-07-07 PROCEDURE — 99213 OFFICE O/P EST LOW 20 MIN: CPT | Performed by: NURSE PRACTITIONER

## 2021-07-07 RX ORDER — CEPHALEXIN 500 MG/1
500 CAPSULE ORAL 2 TIMES DAILY
Qty: 10 CAPSULE | Refills: 0 | Status: SHIPPED | OUTPATIENT
Start: 2021-07-07 | End: 2021-07-09 | Stop reason: ALTCHOICE

## 2021-07-07 ASSESSMENT — ENCOUNTER SYMPTOMS
EYE ITCHING: 0
PHOTOPHOBIA: 0
FOREIGN BODY SENSATION: 0
EYE DISCHARGE: 0
EYE REDNESS: 1

## 2021-07-07 NOTE — PROGRESS NOTES
[facility]  [address] [place] [address] [address] [id number]  Dept: [phone]  Dept Fax: [phone]  Loc: [phone]    River Thomas is a 19 y.o. female who presents today for her medical conditions/complaintsas noted below. River Thomas is c/o of Eye Problem (Swelling and redness to the soft tissue around the left eye)        HPI:     Eye Problem   The left eye is affected. This is a new problem. The current episode started in the past 7 days. The problem occurs constantly. The problem has been gradually worsening. There was no injury mechanism. The patient is experiencing no pain. There is no known exposure to pink eye. She does not wear contacts. Associated symptoms include eye redness (left upper eye lid). Pertinent negatives include no eye discharge, fever, foreign body sensation, itching or photophobia. Treatments tried: warm compresses and benadryl. The treatment provided no relief.        Past Medical History:   Diagnosis Date    Foot fracture, right      Past Surgical History:   Procedure Laterality Date    CHOLECYSTECTOMY, LAPAROSCOPIC N/A 11/22/2019    SINGLE-SITE ROBOTIC ASSISTED LAPAROSCOPIC CHOLECYSTECTOMY performed by Colton Matos DO at [facility] OR    UPPER GASTROINTESTINAL ENDOSCOPY N/A 01/15/2021    Dr Krunal Washington, (-) HH, (-)Sprue       Family History   Problem Relation Age of Onset    Diabetes Paternal Grandfather     Anxiety Disorder Father     Bipolar Disorder Father     Anxiety Disorder Sister     Other Sister         mood disorder    Colon Cancer Neg Hx     Esophageal Cancer Neg Hx     Liver Cancer Neg Hx     Rectal Cancer Neg Hx     Stomach Cancer Neg Hx        Social History     Tobacco Use    Smoking status: Never Smoker    Smokeless tobacco: Never Used   Substance Use Topics    Alcohol use: No      Current Outpatient Medications   Medication Sig Dispense Refill    cephALEXin (KEFLEX) 500 MG capsule Take 1 capsule by internum of left upper eyelid         :Plan   1. Continue warm compresses   2. Antibiotic as prescribed   3. Return to clinic if symptoms fail to improve   4. Return to clinic if new symptoms or if symptoms worsen   5. Follow up with PCP as needed      No orders of the defined types were placed in this encounter. No follow-ups on file. Orders Placed This Encounter   Medications    cephALEXin (KEFLEX) 500 MG capsule     Sig: Take 1 capsule by mouth 2 times daily for 5 days     Dispense:  10 capsule     Refill:  0       Patient given educational materials- see patient instructions. Discussed use, benefit, and side effects of prescribedmedications. All patient questions answered. Pt voiced understanding. Patient Instructions       Patient Education        Styes and Chalazia: Care Instructions  Your Care Instructions     Styes and chalazia (say \"dfp-CDC-eyp-\") are both conditions that can cause swelling of the eyelid. A stye is an infection in the root of an eyelash. The infection causes a tender red lump on the edge of the eyelid. The infection can spread until the whole eyelid becomes red and inflamed. Styes usually break open, and a tiny amount of pus drains. They usually clear up on their own in about a week, but they sometimes need treatment with antibiotics. A chalazion is a lump or cyst in the eyelid (chalazion is singular; chalazia is plural). It is caused by swelling and inflammation of deep oil glands inside the eyelid. Chalazia are usually not infected. They can take a few months to heal.  If a chalazion becomes more swollen and painful or does not go away, you may need to have it drained by your doctor. Follow-up care is a key part of your treatment and safety. Be sure to make and go to all appointments, and call your doctor if you are having problems. It's also a good idea to know your test results and keep a list of the medicines you take. How can you care for yourself at home?   · Do not rub your eyes. Do not squeeze or try to open a stye or chalazion. · To help a stye or chalazion heal faster:  ? Put a warm, moist compress on your eye for 5 to 10 minutes, 3 to 6 times a day. Heat often brings a stye to a point where it drains on its own. Keep in mind that warm compresses will often increase swelling a little at first.  ? Do not use hot water or heat a wet cloth in a microwave oven. The compress may get too hot and can burn the eyelid. · Always wash your hands before and after you use a compress or touch your eyes. · If the doctor gave you antibiotic drops or ointment, use the medicine exactly as directed. Use the medicine for as long as instructed, even if your eye starts to feel better. · To put in eyedrops or ointment:  ? Tilt your head back, and pull your lower eyelid down with one finger. ? Drop or squirt the medicine inside the lower lid. ? Close your eye for 30 to 60 seconds to let the drops or ointment move around. ? Do not touch the ointment or dropper tip to your eyelashes or any other surface. · Do not wear eye makeup or contact lenses until the stye or chalazion heals. · Do not share towels, pillows, or washcloths while you have a stye. When should you call for help? Call your doctor now or seek immediate medical care if:    · You have pain in your eye.     · You have a change in vision or loss of vision.     · Redness and swelling get much worse. Watch closely for changes in your health, and be sure to contact your doctor if:    · Your stye does not get better in 1 week.     · Your chalazion does not start to get better after several weeks. Where can you learn more? Go to https://SecondHomepeannaeweb.[employer]. org and sign in to your [employer] account. Enter M377 in the [employer] box to learn more about \"Styes and Chalazia: Care Instructions. \"     If you do not have an account, please click on the \"Sign Up Now\" link.   Current as of: August 31, 2020               Content Version: 12.9  © 2006-2021 Healthwise, Incorporated. Care instructions adapted under license by [facility] ([facility]). If you have questions about a medical condition or this instruction, always ask your healthcare professional. [address] any warranty or liability for your use of this information. 1. Continue warm compresses   2. Antibiotic as prescribed   3. Return to clinic if symptoms fail to improve   4. Return to clinic if new symptoms or if symptoms worsen   5.  Follow up with PCP as needed         Electronically signed by Corrinne Milo, APRN on 7/7/2021 at 10:57 AM

## 2021-07-09 ENCOUNTER — OFFICE VISIT (OUTPATIENT)
Dept: URGENT CARE | Age: 21
End: 2021-07-09

## 2021-07-09 VITALS
OXYGEN SATURATION: 99 % | RESPIRATION RATE: 22 BRPM | HEIGHT: 68 IN | SYSTOLIC BLOOD PRESSURE: 138 MMHG | HEART RATE: 101 BPM | TEMPERATURE: 98.7 F | BODY MASS INDEX: 21.79 KG/M2 | DIASTOLIC BLOOD PRESSURE: 88 MMHG | WEIGHT: 143.8 LBS

## 2021-07-09 DIAGNOSIS — L03.213 PRESEPTAL CELLULITIS OF LEFT EYE: ICD-10-CM

## 2021-07-09 DIAGNOSIS — H00.024 HORDEOLUM INTERNUM OF LEFT UPPER EYELID: Primary | ICD-10-CM

## 2021-07-09 PROCEDURE — 96372 THER/PROPH/DIAG INJ SC/IM: CPT | Performed by: NURSE PRACTITIONER

## 2021-07-09 PROCEDURE — 99213 OFFICE O/P EST LOW 20 MIN: CPT | Performed by: NURSE PRACTITIONER

## 2021-07-09 RX ORDER — DEXAMETHASONE SODIUM PHOSPHATE 10 MG/ML
5 INJECTION INTRAMUSCULAR; INTRAVENOUS ONCE
Status: COMPLETED | OUTPATIENT
Start: 2021-07-09 | End: 2021-07-09

## 2021-07-09 RX ORDER — CEFDINIR 300 MG/1
300 CAPSULE ORAL 2 TIMES DAILY
Qty: 20 CAPSULE | Refills: 0 | Status: SHIPPED | OUTPATIENT
Start: 2021-07-09 | End: 2021-07-19

## 2021-07-09 RX ORDER — SULFAMETHOXAZOLE AND TRIMETHOPRIM 800; 160 MG/1; MG/1
1 TABLET ORAL 2 TIMES DAILY
Qty: 20 TABLET | Refills: 0 | Status: SHIPPED | OUTPATIENT
Start: 2021-07-09 | End: 2021-07-19

## 2021-07-09 RX ADMIN — DEXAMETHASONE SODIUM PHOSPHATE 5 MG: 10 INJECTION INTRAMUSCULAR; INTRAVENOUS at 14:51

## 2021-07-09 ASSESSMENT — ENCOUNTER SYMPTOMS
EYE REDNESS: 0
EYE DISCHARGE: 0
EYE PAIN: 0
EYE ITCHING: 0

## 2021-07-09 NOTE — PATIENT INSTRUCTIONS
Patient Education        Cellulitis of the Eye: Care Instructions  Your Care Instructions     Cellulitis of the eye is an infection of the skin and tissues around the eye. It is also called preseptal cellulitis or periorbital cellulitis. It is usually caused by bacteria. This type of infection may happen after a sinus infection or a dental infection. It could also happen after an insect bite or an injury to the face. It most often occurs where there is a break in the skin. Cellulitis of the eye can be very serious. It's important to treat it right away. If you do, it usually goes away without lasting problems. Medicine and home treatment can help you get better. Follow-up care is a key part of your treatment and safety. Be sure to make and go to all appointments, and call your doctor if you are having problems. It's also a good idea to know your test results and keep a list of the medicines you take. How can you care for yourself at home? · Take your antibiotics as directed. Do not stop taking them just because you feel better. You need to take the full course of antibiotics. · Do not wear contact lenses unless your doctor tells you it is okay. · Put your head on pillows, and put a cool, damp cloth on your eye. This can reduce swelling and pain. · If your doctor recommends it, use a warm pack on your eye. · Keep the skin around your eye clean and dry. · Be safe with medicines. Read and follow all instructions on the label. ? If the doctor gave you a prescription medicine for pain, take it as prescribed. ? If you are not taking a prescription pain medicine, ask your doctor if you can take an over-the-counter medicine. To prevent cellulitis  · Wash your hands well after you use the bathroom and before and after you eat. · Do not rub or pick at the skin around your eyes. Cellulitis occurs most often where there is a break in the skin.   · If you get a cut, pimple, or insect bite near your eye, clean the area as soon as you can. This can help prevent an infection. ? Wash the area with cool water and a mild soap, such as [place]. ? Do not use rubbing alcohol, hydrogen peroxide, iodine, or Mercurochrome. These can harm the tissues and slow healing. · Call your doctor if you have a sinus infection with redness or swelling of your eyes. When should you call for help? Call your doctor now or seek immediate medical care if:    · You have new or worse signs of an eye infection, such as:  ? Pus or thick discharge coming from the eye.  ? Redness or swelling around the eye.  ? A fever.     · Your eye seems to be bulging out.     · You seem to be getting sicker.     · You have vision changes. Watch closely for changes in your health, and be sure to contact your doctor if:    · You do not get better as expected. Where can you learn more? Go to https://T[employer] Educational Servicespepiceweb.[employer]. org and sign in to your [employer] account. Enter [phone] [id number] in the [employer]Screen Networks box to learn more about \"Cellulitis of the Eye: Care Instructions. \"     If you do not have an account, please click on the \"Sign Up Now\" link. Current as of: August 31, 2020               Content Version: 12.9  © 2006-2021 Healthwise, [employer]. Care instructions adapted under license by [facility] ([facility]). If you have questions about a medical condition or this instruction, always ask your healthcare professional. [place] [address] any warranty or liability for your use of this information. Patient Education        Styes and Chalazia: Care Instructions  Your Care Instructions     Styes and chalazia (say \"wfx-IUE-vju-uh\") are both conditions that can cause swelling of the eyelid. A stye is an infection in the root of an eyelash. The infection causes a tender red lump on the edge of the eyelid. The infection can spread until the whole eyelid becomes red and inflamed. Styes usually break open, and a tiny amount of pus drains.  They usually clear up on their own in about a week, but they sometimes need treatment with antibiotics. A chalazion is a lump or cyst in the eyelid (chalazion is singular; chalazia is plural). It is caused by swelling and inflammation of deep oil glands inside the eyelid. Chalazia are usually not infected. They can take a few months to heal.  If a chalazion becomes more swollen and painful or does not go away, you may need to have it drained by your doctor. Follow-up care is a key part of your treatment and safety. Be sure to make and go to all appointments, and call your doctor if you are having problems. It's also a good idea to know your test results and keep a list of the medicines you take. How can you care for yourself at home? · Do not rub your eyes. Do not squeeze or try to open a stye or chalazion. · To help a stye or chalazion heal faster:  ? Put a warm, moist compress on your eye for 5 to 10 minutes, 3 to 6 times a day. Heat often brings a stye to a point where it drains on its own. Keep in mind that warm compresses will often increase swelling a little at first.  ? Do not use hot water or heat a wet cloth in a microwave oven. The compress may get too hot and can burn the eyelid. · Always wash your hands before and after you use a compress or touch your eyes. · If the doctor gave you antibiotic drops or ointment, use the medicine exactly as directed. Use the medicine for as long as instructed, even if your eye starts to feel better. · To put in eyedrops or ointment:  ? Tilt your head back, and pull your lower eyelid down with one finger. ? Drop or squirt the medicine inside the lower lid. ? Close your eye for 30 to 60 seconds to let the drops or ointment move around. ? Do not touch the ointment or dropper tip to your eyelashes or any other surface. · Do not wear eye makeup or contact lenses until the stye or chalazion heals.   · Do not share towels, pillows, or washcloths while you have a

## 2021-07-09 NOTE — PROGRESS NOTES
[address] [facility]  [address] [facility] [address] [id number]  Dept: [phone]  Dept Fax: [phone]  Loc: [phone]    Jake Parker is a 19 y.o. female who presents today for her medical conditions/complaintsas noted below. Jake Parker is c/o of Facial Swelling (Left)        HPI:     HPI  Dex Jimenez returns to Urgent Care today with worsening eye lid swelling to L eye. She was seen 7/7 and dx with a stye. She was started on keflex and advised to do warm compresses. The swelling has gotten worse and is now in the lower lid. She is able to move her eye without pain. No fever. No drainage.    Past Medical History:   Diagnosis Date    Foot fracture, right      Past Surgical History:   Procedure Laterality Date    CHOLECYSTECTOMY, LAPAROSCOPIC N/A 11/22/2019    SINGLE-SITE ROBOTIC ASSISTED LAPAROSCOPIC CHOLECYSTECTOMY performed by Chari Tirado DO at [facility] OR    UPPER GASTROINTESTINAL ENDOSCOPY N/A 01/15/2021    Dr Zhen Silva, (-) HH, (-)[place]       Family History   Problem Relation Age of Onset    Diabetes Paternal Grandfather     Anxiety Disorder Father     Bipolar Disorder Father     Anxiety Disorder Sister     Other Sister         mood disorder    Colon Cancer Neg Hx     Esophageal Cancer Neg Hx     Liver Cancer Neg Hx     Rectal Cancer Neg Hx     Stomach Cancer Neg Hx        Social History     Tobacco Use    Smoking status: Never Smoker    Smokeless tobacco: Never Used   Substance Use Topics    Alcohol use: No      Current Outpatient Medications   Medication Sig Dispense Refill    sulfamethoxazole-trimethoprim (BACTRIM DS) 800-160 MG per tablet Take 1 tablet by mouth 2 times daily for 10 days 20 tablet 0    cefdinir (OMNICEF) 300 MG capsule Take 1 capsule by mouth 2 times daily for 10 days 20 capsule 0    omeprazole (PRILOSEC) 40 MG delayed release capsule TAKE 1 CAPSULE BY MOUTH EVERY DAY 90 capsule 3    etonogestrel-ethinyl estradiol (NUVARING) 0.12-0.015 MG/24HR vaginal ring Place 1 each vaginally every 21 days Insert one (1) ring vaginally and leave in place for three (3) weeks, then remove for one (1) week. 3 each 3    sucralfate (CARAFATE) 1 GM/10ML suspension Take 10 mLs by mouth 4 times daily (after meals and at bedtime) 1200 mL 1     No current facility-administered medications for this visit. No Known Allergies    Health Maintenance   Topic Date Due    Hepatitis C screen  Never done    HPV vaccine (1 - 2-dose series) Never done    HIV screen  Never done    Chlamydia screen  Never done    Flu vaccine (1) 09/01/2021    Cervical cancer screen  12/09/2022    DTaP/Tdap/Td vaccine (8 - Td or Tdap) 04/24/2029    Hepatitis B vaccine  Completed    Hib vaccine  Completed    Varicella vaccine  Completed    COVID-19 Vaccine  Completed    Hepatitis A vaccine  Aged Out    Meningococcal (ACWY) vaccine  Aged Out    Pneumococcal 0-64 years Vaccine  Aged Out       Subjective:     Review of Systems   Constitutional: Negative for chills and fever. Eyes: Negative for pain, discharge, redness and itching. Upper and lower eye lid swelling    The stye on the upper lid is large    All other systems reviewed and are negative.      :Objective      Physical Exam  Vitals and nursing note reviewed. Constitutional:       General: She is not in acute distress. Appearance: Normal appearance. She is well-developed. She is not ill-appearing or diaphoretic. HENT:      Head: Normocephalic and atraumatic. Eyes:      Conjunctiva/sclera: Conjunctivae normal.      Pupils: Pupils are equal, round, and reactive to light. Pulmonary:      Effort: Pulmonary effort is normal. No respiratory distress. Skin:     General: Skin is warm and dry. Findings: No rash. Neurological:      Mental Status: She is alert and oriented to person, place, and time.    Psychiatric:         Mood and Affect: Mood normal.         Behavior: Behavior normal. /88   Pulse 101   Temp 98.7 °F (37.1 °C)   Resp 22   Ht 5' 8\" (1.727 m)   Wt 143 lb 12.8 oz (65.2 kg)   LMP 06/14/2021   SpO2 99%   BMI 21.86 kg/m²     :Assessment       Diagnosis Orders   1. Hordeolum internum of left upper eyelid     2. Preseptal cellulitis of left eye         :Plan   Suspect pt may have preseptal cellulitis and the stye has gotten worse. She is able to move her eye without pain. We discussed that if her symptoms worsen or do not improve within 24 hours of starting the new antibiotics she is to go to the ER. She voiced understanding. 1. Stop the keflex   2. Start bactrim and omnicef   3. If no improvement or it worsens in 24 hours go to ER   4. Follow up with eye doctor and PCP     No orders of the defined types were placed in this encounter. No follow-ups on file. Orders Placed This Encounter   Medications    dexamethasone (DECADRON) injection 5 mg    sulfamethoxazole-trimethoprim (BACTRIM DS) 800-160 MG per tablet     Sig: Take 1 tablet by mouth 2 times daily for 10 days     Dispense:  20 tablet     Refill:  0    cefdinir (OMNICEF) 300 MG capsule     Sig: Take 1 capsule by mouth 2 times daily for 10 days     Dispense:  20 capsule     Refill:  0       Patient given educational materials- see patient instructions. Discussed use, benefit, and side effects of prescribedmedications. All patient questions answered. Pt voiced understanding. Patient Instructions       Patient Education        Cellulitis of the Eye: Care Instructions  Your Care Instructions     Cellulitis of the eye is an infection of the skin and tissues around the eye. It is also called preseptal cellulitis or periorbital cellulitis. It is usually caused by bacteria. This type of infection may happen after a sinus infection or a dental infection. It could also happen after an insect bite or an injury to the face. It most often occurs where there is a break in the skin.   Cellulitis of the eye can be very serious. It's important to treat it right away. If you do, it usually goes away without lasting problems. Medicine and home treatment can help you get better. Follow-up care is a key part of your treatment and safety. Be sure to make and go to all appointments, and call your doctor if you are having problems. It's also a good idea to know your test results and keep a list of the medicines you take. How can you care for yourself at home? · Take your antibiotics as directed. Do not stop taking them just because you feel better. You need to take the full course of antibiotics. · Do not wear contact lenses unless your doctor tells you it is okay. · Put your head on pillows, and put a cool, damp cloth on your eye. This can reduce swelling and pain. · If your doctor recommends it, use a warm pack on your eye. · Keep the skin around your eye clean and dry. · Be safe with medicines. Read and follow all instructions on the label. ? If the doctor gave you a prescription medicine for pain, take it as prescribed. ? If you are not taking a prescription pain medicine, ask your doctor if you can take an over-the-counter medicine. To prevent cellulitis  · Wash your hands well after you use the bathroom and before and after you eat. · Do not rub or pick at the skin around your eyes. Cellulitis occurs most often where there is a break in the skin. · If you get a cut, pimple, or insect bite near your eye, clean the area as soon as you can. This can help prevent an infection. ? Wash the area with cool water and a mild soap, such as [place]. ? Do not use rubbing alcohol, hydrogen peroxide, iodine, or Mercurochrome. These can harm the tissues and slow healing. · Call your doctor if you have a sinus infection with redness or swelling of your eyes. When should you call for help?    Call your doctor now or seek immediate medical care if:    · You have new or worse signs of an eye infection, such as:  ? Pus or thick discharge coming from the eye.  ? Redness or swelling around the eye.  ? A fever.     · Your eye seems to be bulging out.     · You seem to be getting sicker.     · You have vision changes. Watch closely for changes in your health, and be sure to contact your doctor if:    · You do not get better as expected. Where can you learn more? Go to https://chpepiceweb.[employer]. org and sign in to your iodine account. Enter [phone] in the [facility] box to learn more about \"Cellulitis of the Eye: Care Instructions. \"     If you do not have an account, please click on the \"Sign Up Now\" link. Current as of: August 31, 2020               Content Version: 12.9  © 2006-2021 [employer]. Care instructions adapted under license by [facility] ([facility]). If you have questions about a medical condition or this instruction, always ask your healthcare professional. [place] [address] any warranty or liability for your use of this information. Patient Education        Styes and Chalazia: Care Instructions  Your Care Instructions     Styes and chalazia (say \"vfc-DBL-wsi-uh\") are both conditions that can cause swelling of the eyelid. A stye is an infection in the root of an eyelash. The infection causes a tender red lump on the edge of the eyelid. The infection can spread until the whole eyelid becomes red and inflamed. Styes usually break open, and a tiny amount of pus drains. They usually clear up on their own in about a week, but they sometimes need treatment with antibiotics. A chalazion is a lump or cyst in the eyelid (chalazion is singular; chalazia is plural). It is caused by swelling and inflammation of deep oil glands inside the eyelid. Chalazia are usually not infected. They can take a few months to heal.  If a chalazion becomes more swollen and painful or does not go away, you may need to have it drained by your doctor.   Follow-up care is a key part of your treatment and safety. Be sure to make and go to all appointments, and call your doctor if you are having problems. It's also a good idea to know your test results and keep a list of the medicines you take. How can you care for yourself at home? · Do not rub your eyes. Do not squeeze or try to open a stye or chalazion. · To help a stye or chalazion heal faster:  ? Put a warm, moist compress on your eye for 5 to 10 minutes, 3 to 6 times a day. Heat often brings a stye to a point where it drains on its own. Keep in mind that warm compresses will often increase swelling a little at first.  ? Do not use hot water or heat a wet cloth in a microwave oven. The compress may get too hot and can burn the eyelid. · Always wash your hands before and after you use a compress or touch your eyes. · If the doctor gave you antibiotic drops or ointment, use the medicine exactly as directed. Use the medicine for as long as instructed, even if your eye starts to feel better. · To put in eyedrops or ointment:  ? Tilt your head back, and pull your lower eyelid down with one finger. ? Drop or squirt the medicine inside the lower lid. ? Close your eye for 30 to 60 seconds to let the drops or ointment move around. ? Do not touch the ointment or dropper tip to your eyelashes or any other surface. · Do not wear eye makeup or contact lenses until the stye or chalazion heals. · Do not share towels, pillows, or washcloths while you have a stye. When should you call for help? Call your doctor now or seek immediate medical care if:    · You have pain in your eye.     · You have a change in vision or loss of vision.     · Redness and swelling get much worse. Watch closely for changes in your health, and be sure to contact your doctor if:    · Your stye does not get better in 1 week.     · Your chalazion does not start to get better after several weeks. Where can you learn more? Go to https://eliceo.[facility]. org and sign in to your [employer] account. Enter L968 in the [facility] box to learn more about \"Styes and Chalazia: Care Instructions. \"     If you do not have an account, please click on the \"Sign Up Now\" link. Current as of: August 31, 2020               Content Version: 12.9  © 4956-8347 [employer]. Care instructions adapted under license by [facility] ([facility]). If you have questions about a medical condition or this instruction, always ask your healthcare professional. [place] [address] any warranty or liability for your use of this information. 1. Stop the keflex   2. Start bactrim and omnicef   3. If no improvement or it worsens in 24 hours go to ER   4.  Follow up with eye doctor and PCP           Electronically signed by RANDAL Morse on 7/9/2021 at 3:32 PM

## 2021-07-12 ENCOUNTER — TELEPHONE (OUTPATIENT)
Dept: FAMILY MEDICINE CLINIC | Age: 21
End: 2021-07-12

## 2021-07-12 NOTE — TELEPHONE ENCOUNTER
Pt left vm stating that her eye was swollen. I returned a call to the patient and asked her had she been bitten by something or was she allergic so something? She said no and she went to Urgent Care. They told her she had a stye. She states it's not swollen aymore`and actually feels a lot better. I told her ok if she had any other issues to call the office back.

## 2021-12-16 DIAGNOSIS — N92.6 IRREGULAR MENSES: ICD-10-CM

## 2021-12-16 DIAGNOSIS — Z30.44 ENCOUNTER FOR SURVEILLANCE OF VAGINAL RING HORMONAL CONTRACEPTIVE DEVICE: ICD-10-CM

## 2021-12-16 RX ORDER — ETONOGESTREL AND ETHINYL ESTRADIOL 11.7; 2.7 MG/1; MG/1
INSERT, EXTENDED RELEASE VAGINAL
Qty: 3 EACH | Refills: 2 | Status: SHIPPED | OUTPATIENT
Start: 2021-12-16

## 2022-03-16 DIAGNOSIS — R10.13 EPIGASTRIC PAIN: ICD-10-CM

## 2022-03-17 RX ORDER — OMEPRAZOLE 40 MG/1
40 CAPSULE, DELAYED RELEASE ORAL DAILY
Qty: 30 CAPSULE | Refills: 0 | Status: SHIPPED | OUTPATIENT
Start: 2022-03-17

## 2022-03-17 NOTE — TELEPHONE ENCOUNTER
Pt needs OV, will fill for one month. For further refills, will need to be seen.   OK to use OTC or get from PCP if pt prefers

## 2022-06-16 DIAGNOSIS — R10.13 EPIGASTRIC PAIN: ICD-10-CM

## 2022-06-16 RX ORDER — OMEPRAZOLE 40 MG/1
CAPSULE, DELAYED RELEASE ORAL
Qty: 90 CAPSULE | Refills: 1 | OUTPATIENT
Start: 2022-06-16

## 2022-06-16 NOTE — TELEPHONE ENCOUNTER
Last Catina Manner aprn 2-24-21. No FU scheduled. Egd  1-15-21. LRF JG 3-17-21 # 30 x 0. I tried to call the patient to get her Annual OV scheduled, she did not answer. I notified the Pharmacy to have this patient call and get her OV scheduled for refills.  iBlly dang

## 2022-08-29 DIAGNOSIS — Z30.44 ENCOUNTER FOR SURVEILLANCE OF VAGINAL RING HORMONAL CONTRACEPTIVE DEVICE: ICD-10-CM

## 2022-08-29 DIAGNOSIS — N92.6 IRREGULAR MENSES: ICD-10-CM

## 2022-08-29 RX ORDER — ETONOGESTREL AND ETHINYL ESTRADIOL 11.7; 2.7 MG/1; MG/1
INSERT, EXTENDED RELEASE VAGINAL
Refills: 2 | OUTPATIENT
Start: 2022-08-29

## (undated) DEVICE — BLADE LARYNSCP HNDL MAC 3 DISP CURAVIEW LED

## (undated) DEVICE — ADHESIVE SKIN CLSR 0.7ML TOP DERMBND ADV

## (undated) DEVICE — SUTURE MCRYL SZ 4-0 L18IN ABSRB UD L19MM PS-2 3/8 CIR PRIM Y496G

## (undated) DEVICE — FORCEPS BX 240CM 2.4MM L NDL RAD JAW 4 M00513334

## (undated) DEVICE — TROCAR: Brand: KII FIOS FIRST ENTRY

## (undated) DEVICE — GLOVE SURG SZ 7 CRM LTX FREE POLYISOPRENE POLYMER BEAD ANTI

## (undated) DEVICE — SUTURE VCRL SZ 0 L27IN ABSRB VLT L36MM UR-5 5/8 CIR J376H

## (undated) DEVICE — Z DISCONTINUED NO SUB IDED PLATE DISP SPL TYP ERBE NESSY P FOR PSD-60

## (undated) DEVICE — ARM DRAPE

## (undated) DEVICE — GENERAL LAP CDS

## (undated) DEVICE — COVER,MAYO STAND,STERILE: Brand: MEDLINE

## (undated) DEVICE — BLADELESS OBTURATOR: Brand: WECK VISTA

## (undated) DEVICE — CANNULA SEAL

## (undated) DEVICE — BAG SPEC REM 224ML W4XL6IN DIA10MM 1 HND GYN DISP ENDOPCH